# Patient Record
Sex: MALE | Race: WHITE | URBAN - METROPOLITAN AREA
[De-identification: names, ages, dates, MRNs, and addresses within clinical notes are randomized per-mention and may not be internally consistent; named-entity substitution may affect disease eponyms.]

---

## 2017-03-22 ENCOUNTER — OFFICE VISIT (OUTPATIENT)
Dept: FAMILY MEDICINE CLINIC | Age: 42
End: 2017-03-22

## 2017-03-22 VITALS
TEMPERATURE: 98 F | SYSTOLIC BLOOD PRESSURE: 116 MMHG | HEART RATE: 91 BPM | WEIGHT: 150 LBS | RESPIRATION RATE: 20 BRPM | DIASTOLIC BLOOD PRESSURE: 80 MMHG | OXYGEN SATURATION: 98 % | HEIGHT: 66 IN | BODY MASS INDEX: 24.11 KG/M2

## 2017-03-22 DIAGNOSIS — F71 MR (MENTAL RETARDATION), MODERATE: ICD-10-CM

## 2017-03-22 DIAGNOSIS — K59.00 CONSTIPATION, UNSPECIFIED CONSTIPATION TYPE: Primary | ICD-10-CM

## 2017-03-22 DIAGNOSIS — R56.9 SEIZURE (HCC): ICD-10-CM

## 2017-03-22 DIAGNOSIS — L70.9 ACNE, UNSPECIFIED ACNE TYPE: ICD-10-CM

## 2017-03-22 RX ORDER — CLOBAZAM 10 MG/1
5 TABLET ORAL DAILY
COMMUNITY
End: 2017-03-30 | Stop reason: SDUPTHER

## 2017-03-22 RX ORDER — LAMOTRIGINE 100 MG/1
100 TABLET ORAL 2 TIMES DAILY
COMMUNITY
End: 2017-03-30 | Stop reason: SDUPTHER

## 2017-03-22 RX ORDER — ERYTHROMYCIN AND BENZOYL PEROXIDE 30; 50 MG/G; MG/G
GEL TOPICAL
Qty: 46.6 G | Refills: 11 | Status: SHIPPED | OUTPATIENT
Start: 2017-03-22 | End: 2018-05-17 | Stop reason: SDUPTHER

## 2017-03-22 RX ORDER — DIVALPROEX SODIUM 125 MG/1
125 TABLET, DELAYED RELEASE ORAL
COMMUNITY
End: 2017-03-30 | Stop reason: SDUPTHER

## 2017-03-22 RX ORDER — DIVALPROEX SODIUM 250 MG/1
250 TABLET, DELAYED RELEASE ORAL 2 TIMES DAILY
COMMUNITY
End: 2017-03-30 | Stop reason: SDUPTHER

## 2017-03-22 RX ORDER — LORAZEPAM 1 MG/1
1 TABLET ORAL
COMMUNITY
End: 2019-06-20 | Stop reason: ALTCHOICE

## 2017-03-22 RX ORDER — LACOSAMIDE 200 MG/1
200 TABLET ORAL 2 TIMES DAILY
COMMUNITY
End: 2017-03-30 | Stop reason: SDUPTHER

## 2017-03-22 RX ORDER — RISPERIDONE 1 MG/1
1 TABLET, FILM COATED ORAL
COMMUNITY
End: 2021-09-22 | Stop reason: ALTCHOICE

## 2017-03-22 RX ORDER — CLOBAZAM 10 MG/1
10 TABLET ORAL
COMMUNITY
End: 2017-03-30 | Stop reason: SDUPTHER

## 2017-03-22 RX ORDER — BUSPIRONE HYDROCHLORIDE 10 MG/1
20 TABLET ORAL 2 TIMES DAILY
COMMUNITY
End: 2019-06-20 | Stop reason: ALTCHOICE

## 2017-03-22 RX ORDER — DOCUSATE SODIUM 100 MG/1
100 CAPSULE, LIQUID FILLED ORAL DAILY
Qty: 30 CAP | Refills: 11 | Status: SHIPPED | OUTPATIENT
Start: 2017-03-22 | End: 2018-03-02 | Stop reason: SDUPTHER

## 2017-03-22 NOTE — PROGRESS NOTES
New group home patient here to est care. Reviewed medical history, meds and allergies from paperwork provided by the . Patient c/o chest discomfort while he is sitting in his chair and bends over.  states he needs a stool softener ordered because he has frequent constipation and acne medication for his back. Patient has psych issues and has an appointment with Dr. Addie Ferreira on Friday3/25/2017 for refills of psych medication. Chief Complaint   Patient presents with   [address] patient here to [facility] care    Acne     need Rx    Constipation     need stool softener    Chest Pain     chest discomfort when he bends down in chair. he is a 39y.o. year old male who presents for evalution. Reviewed PmHx, RxHx, FmHx, SocHx, AllgHx and updated and dated in the chart.     Patient Active Problem List    Diagnosis    Constipation    MR (mental retardation), moderate    Acne    Seizure ([facility] [address]       Review of Systems - negative except as listed above in the HPI    Objective:     Vitals:    03/22/17 0833   BP: 116/80   Pulse: 91   Resp: 20   Temp: 98 °F (36.7 °C)   SpO2: 98%   Weight: 150 lb (68 kg)   Height: 5' 6\" (1.676 m)     Physical Examination: General appearance - alert, well appearing, and in no distress  Eyes - pupils equal and reactive, extraocular eye movements intact  Ears - bilateral TM's and external ear canals normal  Nose - normal and patent, no erythema, discharge or polyps  Mouth - mucous membranes moist, pharynx normal without lesions  Neck - supple, no significant adenopathy  Chest - clear to auscultation, no wheezes, rales or rhonchi, symmetric air entry  Heart - normal rate, regular rhythm, normal S1, S2, no murmurs, rubs, clicks or gallops  Abdomen - soft, nontender, nondistended, no masses or organomegaly  Extremities - peripheral pulses normal, no pedal edema, no clubbing or cyanosis  Skin:  Mild acne on back    Assessment/ Plan:   Marcos Tracy was seen today for establish care, acne, constipation and chest pain. Diagnoses and all orders for this visit:    Constipation, unspecified constipation type  -     docusate sodium (COLACE) 100 mg capsule; Take 1 Cap by mouth daily for 90 days.  -add rx    Acne, unspecified acne type  -     benzoyl peroxide-erythromycin (BENZAMYCIN) 3-5 % topical gel; Apply  to affected area two (2) times daily as needed (acne). -add rx    MR (mental retardation), moderate  -stable    Seizure ([facility] [address]  -Sz protocol written for pt     Follow-up Disposition:  Return if symptoms worsen or fail to improve. I have discussed the diagnosis with the patient and the intended plan as seen in the above orders. The patient understands and agrees with the plan. The patient has received an after-visit summary and questions were answered concerning future plans. Medication Side Effects and Warnings were discussed with patient  Patient Labs were reviewed and or requested:  Patient Past Records were reviewed and or requested    Simon Dey M.D. There are no Patient Instructions on file for this visit.

## 2017-03-22 NOTE — MR AVS SNAPSHOT
Visit Information Date & Time Provider Department Dept. Phone Encounter #  
 3/22/2017  8:10 AM Dharmesh Evans MD [address] [facility] [phone] [id number] Follow-up Instructions Return if symptoms worsen or fail to improve. Upcoming Health Maintenance Date Due DTaP/Tdap/Td series (1 - Tdap) 11/27/1996 Allergies as of 3/22/2017  Review Complete On: 3/22/2017 By: Dharmesh Evans MD  
 No Known Allergies Current Immunizations  Never Reviewed No immunizations on file. Not reviewed this visit You Were Diagnosed With   
  
 Codes Comments Constipation, unspecified constipation type    -  Primary ICD-10-CM: K59.00 ICD-9-CM: 564.00 Acne, unspecified acne type     ICD-10-CM: L70.9 ICD-9-CM: 706. 1 MR (mental retardation), moderate     ICD-10-CM: F71 
ICD-9-CM: 318.0 Seizure ([address]     ICD-10-CM: R56.9 ICD-9-CM: 780.39 Vitals BP Pulse Temp Resp Height(growth percentile) Weight(growth percentile) 116/80 91 98 °F (36.7 °C) 20 5' 6\" (1.676 m) 150 lb (68 kg) SpO2 BMI Smoking Status 98% 24.21 kg/m2 Never Smoker Vitals History BMI and BSA Data Body Mass Index Body Surface Area  
 24.21 kg/m 2 1.78 m 2 Preferred Pharmacy Pharmacy Name Phone [facility] [phone] Your Updated Medication List  
  
   
This list is accurate as of: 3/22/17  9:04 AM.  Always use your most recent med list.  
  
  
  
  
 benzoyl peroxide-erythromycin 3-5 % topical gel Commonly known as:  Haresh Atiya Apply  to affected area two (2) times daily as needed (acne). busPIRone 10 mg tablet Commonly known as:  BUSPAR Take 20 mg by mouth two (2) times a day. * divalproex  mg tablet Commonly known as:  DEPAKOTE Take 250 mg by mouth two (2) times a day. * divalproex  mg tablet Commonly known as:  DEPAKOTE  
 Take 125 mg by mouth nightly. docusate sodium 100 mg capsule Commonly known as:  Ltanya Curtis Take 1 Cap by mouth daily for 90 days. lamoTRIgine 100 mg tablet Commonly known as: LaMICtal  
Take 100 mg by mouth two (2) times a day. LORazepam 1 mg tablet Commonly known as:  ATIVAN Take 1 mg by mouth every six (6) hours as needed for Anxiety. * ONFI 10 mg Tab tablet Generic drug:  cloBAZam  
Take 5 mg by mouth daily. * ONFI 10 mg Tab tablet Generic drug:  cloBAZam  
Take 10 mg by mouth nightly. RisperDAL 1 mg tablet Generic drug:  risperiDONE Take 1 mg by mouth nightly. VIMPAT 200 mg Tab tablet Generic drug:  lacosamide Take 200 mg by mouth two (2) times a day. * Notice: This list has 4 medication(s) that are the same as other medications prescribed for you. Read the directions carefully, and ask your doctor or other care provider to review them with you. Prescriptions Sent to Pharmacy Refills  
 docusate sodium (COLACE) 100 mg capsule 11 Sig: Take 1 Cap by mouth daily for 90 days. Class: Normal  
 Pharmacy: [address] [place] [address] Ph #: [phone] Route: Oral  
 benzoyl peroxide-erythromycin (BENZAMYCIN) 3-5 % topical gel 11 Sig: Apply  to affected area two (2) times daily as needed (acne). Class: Normal  
 Pharmacy: [address] [place] [address] Ph #: [phone] Route: Topical  
  
Follow-up Instructions Return if symptoms worsen or fail to improve. Introducing [facility]! Fredrick Eugene introduces [employer] patient portal. Now you can access parts of your medical record, email your doctor's office, and request medication refills online. 1. In your internet browser, go to [email or web address]. [employer]. [employer] 2. Click on the First Time User? Click Here link in the Sign In box. You will see the New Member Sign Up page. 3. Enter your [employer] Access Code exactly as it appears below. You will not need to use this code after youve completed the sign-up process. If you do not sign up before the expiration date, you must request a new code. · [employer] Access Code: [email or web address] Expires: 6/20/2017  9:04 AM 
 
4. Enter the last four digits of your Social Security Number (xxxx) and Date of Birth (mm/dd/yyyy) as indicated and click Submit. You will be taken to the next sign-up page. 5. Create a [employer] ID. This will be your [employer] login ID and cannot be changed, so think of one that is secure and easy to remember. 6. Create a [employer] password. You can change your password at any time. 7. Enter your Password Reset Question and Answer. This can be used at a later time if you forget your password. 8. Enter your e-mail address. You will receive e-mail notification when new information is available in [address]. 9. Click Sign Up. You can now view and download portions of your medical record. 10. Click the Download Summary menu link to download a portable copy of your medical information. If you have questions, please visit the Frequently Asked Questions section of the [employer] website. Remember, [employer] is NOT to be used for urgent needs. For medical emergencies, dial 911. Now available from your iPhone and Android! Please provide this summary of care documentation to your next provider. If you have any questions after today's visit, please call [phone].

## 2017-03-30 ENCOUNTER — OFFICE VISIT (OUTPATIENT)
Dept: NEUROLOGY | Age: 42
End: 2017-03-30

## 2017-03-30 VITALS
RESPIRATION RATE: 18 BRPM | WEIGHT: 150 LBS | HEIGHT: 66 IN | DIASTOLIC BLOOD PRESSURE: 67 MMHG | TEMPERATURE: 98.4 F | BODY MASS INDEX: 24.11 KG/M2 | HEART RATE: 67 BPM | SYSTOLIC BLOOD PRESSURE: 110 MMHG | OXYGEN SATURATION: 98 %

## 2017-03-30 DIAGNOSIS — G40.009 LOCALIZATION-RELATED IDIOPATHIC EPILEPSY AND EPILEPTIC SYNDROMES WITH SEIZURES OF LOCALIZED ONSET, NOT INTRACTABLE, WITHOUT STATUS EPILEPTICUS (HCC): Primary | ICD-10-CM

## 2017-03-30 RX ORDER — LACOSAMIDE 200 MG/1
200 TABLET ORAL 2 TIMES DAILY
Qty: 180 TAB | Refills: 3 | Status: SHIPPED | OUTPATIENT
Start: 2017-03-30 | End: 2019-06-20 | Stop reason: ALTCHOICE

## 2017-03-30 RX ORDER — DIVALPROEX SODIUM 250 MG/1
250 TABLET, DELAYED RELEASE ORAL 2 TIMES DAILY
Qty: 180 TAB | Refills: 3 | Status: SHIPPED | OUTPATIENT
Start: 2017-03-30 | End: 2018-04-03 | Stop reason: SDUPTHER

## 2017-03-30 RX ORDER — DIVALPROEX SODIUM 125 MG/1
125 TABLET, DELAYED RELEASE ORAL
Qty: 90 TAB | Refills: 3 | Status: SHIPPED | OUTPATIENT
Start: 2017-03-30 | End: 2018-04-03 | Stop reason: SDUPTHER

## 2017-03-30 RX ORDER — LAMOTRIGINE 100 MG/1
100 TABLET ORAL 2 TIMES DAILY
Qty: 180 TAB | Refills: 3 | Status: SHIPPED | OUTPATIENT
Start: 2017-03-30 | End: 2018-04-03 | Stop reason: SDUPTHER

## 2017-03-30 RX ORDER — CLOBAZAM 10 MG/1
10 TABLET ORAL
Qty: 90 TAB | Refills: 3 | Status: SHIPPED | OUTPATIENT
Start: 2017-03-30 | End: 2021-09-22

## 2017-03-30 RX ORDER — CLOBAZAM 10 MG/1
5 TABLET ORAL DAILY
Qty: 90 TAB | Refills: 3 | Status: SHIPPED | OUTPATIENT
Start: 2017-03-30 | End: 2019-06-20 | Stop reason: ALTCHOICE

## 2017-03-30 NOTE — PATIENT INSTRUCTIONS
Information Regarding Testing and Medication    If you have physican order for a test or a medication denied by your insurance company, this does not mean the test or medication is not appropriate for you as that is a medical decision, not a decision to be made by an insurance company representative or by an [facility] physician who has not interviewed and examined you. This is a decision to be made between you and your physician. The denial of services is a contractual matter between you and your insurance company, not an issue between your physician and the insurance company. If your test or medication is denied, you can take the following steps to help resolve the issue:    1. File a complaint with the [facility] regarding your insurance company's denial of services ordered for you. You can do this either by calling them directly or by completing an on-line complaint form on the [email or web address]. This can be found at [email or web address] Correspondent    2. Also file a formal complaint with your insurance company and ask to have the name of the person denying the service so that you may explore a legal option should you be harmed by this denial of service. Again, the fact the insurance company will not pay for the service does not mean it is not medically necessary and I would encourage you to follow through with the plan that was made with your physician    3. File a written complaint with your employer so your employer and benefit manager is aware of the poor coverage they are providing their employees. If you have medicare/medicaid, complain to your representative in the House and to your Bette Gibbons. Seizure: Care Instructions  Your Care Instructions    Seizures are caused by abnormal patterns of electrical signals in the brain. They are different for each person. Seizures can affect movement, speech, vision, or awareness.  Some people have only slight shaking of a hand and do not pass out. Other people may pass out and have violent shaking of the whole body. Some people appear to stare into space. They are awake, but they can't respond normally. Later, they may not remember what happened. You may need tests to identify the type and cause of the seizures. A seizure may occur only once, or you may have them more than one time. Taking medicines as directed and following up with your doctor may help keep you from having more seizures. The doctor has checked you carefully, but problems can develop later. If you notice any problems or new symptoms, get medical treatment right away. Follow-up care is a key part of your treatment and safety. Be sure to make and go to all appointments, and call your doctor if you are having problems. It's also a good idea to know your test results and keep a list of the medicines you take. How can you care for yourself at home? · Be safe with medicines. Take your medicines exactly as prescribed. Call your doctor if you think you are having a problem with your medicine. · Do not do any activity that could be dangerous to you or others until your doctor says it is safe to do so. For example, do not drive a car, operate machinery, swim, or climb ladders. · Be sure that anyone treating you for any health problem knows that you have had a seizure and what medicines you are taking for it. · Identify and avoid things that may make you more likely to have a seizure. These may include lack of sleep, alcohol or drug use, stress, or not eating. · Make sure you go to your follow-up appointment. When should you call for help? Call 911 anytime you think you may need emergency care. For example, call if:  · You have another seizure. · You have more than one seizure in 24 hours. · You have new symptoms, such as trouble walking, speaking, or thinking clearly.   Call your doctor now or seek immediate medical care if:  · You are not acting normally. Watch closely for changes in your health, and be sure to contact your doctor if you have any problems. Where can you learn more? Go to http://pauly-shantel.info/. Enter F201 in the search box to learn more about \"Seizure: Care Instructions. \"  Current as of: October 14, 2016  Content Version: 11.2  © 5918-7827 [employer]. Care instructions adapted under license by [employer] (which disclaims liability or warranty for this information). If you have questions about a medical condition or this instruction, always ask your healthcare professional. [address] any warranty or liability for your use of this information. Learning About Living Perroy  What is a living will? A living will is a legal form you use to write down the kind of care you want at the end of your life. It is used by the health professionals who will treat you if you aren't able to decide for yourself. If you put your wishes in writing, your loved ones and others will know what kind of care you want. They won't need to guess. This can ease your mind and be helpful to others. A living will is not the same as an estate or property will. An estate will explains what you want to happen with your money and property after you die. Is a living will a legal document? A living will is a legal document. Each state has its own laws about living patten. If you move to another state, make sure that your living will is legal in the state where you now live. Or you might use a universal form that has been approved by many states. This kind of form can sometimes be completed and stored online. Your electronic copy will then be available wherever you have a connection to the Internet. In most cases, doctors will respect your wishes even if you have a form from a different state. · You don't need an  to complete a living will.  But legal advice can be helpful if your state's laws are unclear, your health history is complicated, or your family can't agree on what should be in your living will. · You can change your living will at any time. Some people find that their wishes about end-of-life care change as their health changes. · In addition to making a living will, think about completing a medical power of  form. This form lets you name the person you want to make end-of-life treatment decisions for you (your \"health care agent\") if you're not able to. Many hospitals and nursing homes will give you the forms you need to complete a living will and a medical power of . · Your living will is used only if you can't make or communicate decisions for yourself anymore. If you become able to make decisions again, you can accept or refuse any treatment, no matter what you wrote in your living will. · Your state may offer an online registry. This is a place where you can store your living will online so the doctors and nurses who need to treat you can find it right away. What should you think about when creating a living will? Talk about your end-of-life wishes with your family members and your doctor. Let them know what you want. That way the people making decisions for you won't be surprised by your choices. Think about these questions as you make your living will:  · Do you know enough about life support methods that might be used? If not, talk to your doctor so you know what might be done if you can't breathe on your own, your heart stops, or you're unable to swallow. · What things would you still want to be able to do after you receive life-support methods? Would you want to be able to walk? To speak? To eat on your own? To live without the help of machines? · If you have a choice, where do you want to be cared for? In your home? At a hospital or nursing home? · Do you want certain Yazdanism practices performed if you become very ill?   · If you have a choice at the end of your life, where would you prefer to die? At home? In a hospital or nursing home? Somewhere else? · Would you prefer to be buried or cremated? · Do you want your organs to be donated after you die? What should you do with your living will? · Make sure that your family members and your health care agent have copies of your living will. · Give your doctor a copy of your living will to keep in your medical record. If you have more than one doctor, make sure that each one has a copy. · You may want to put a copy of your living will where it can be easily found. Where can you learn more? Go to http://pauly-shantel.info/. Enter Y729 in the search box to learn more about \"Learning About Living Perrotyler. \"  Current as of: February 24, 2016  Content Version: 11.2  © 2632-3635 [employer]. Care instructions adapted under license by [employer] (which disclaims liability or warranty for this information). If you have questions about a medical condition or this instruction, always ask your healthcare professional. [place] [address] any warranty or liability for your use of this information. Medicine renewed by request.  Will see patient back in 6 months. We will try to obtain information from what appears to be last neurologist Dr. Enmanuel Espinoza in [place].

## 2017-03-30 NOTE — MR AVS SNAPSHOT
Visit Information Date & Time Provider Department Dept. Phone Encounter #  
 3/30/2017  2:00 PM Marylen Gale, MD [facility] [phone] [id number] Follow-up Instructions Return in about 6 months (around 9/30/2017). Your Appointments 4/5/2017  9:50 AM  
ESTABLISHED PATIENT with Amado Mart MD  
[facility] [address] [place] [address]) Appt Note: Group Home Phys, PPD  
 [id number] [place] [address] Road [address] [phone]  
  
   
 [address]  [address] [address] Upcoming Health Maintenance Date Due DTaP/Tdap/Td series (1 - Tdap) 11/27/1996 Allergies as of 3/30/2017  Review Complete On: 3/22/2017 By: Amado Mart MD  
 No Known Allergies Current Immunizations  Never Reviewed No immunizations on file. Not reviewed this visit You Were Diagnosed With   
  
 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  
 24.21 kg/m 2 1.78 m 2 [phone] Your Updated Medication List  
  
   
This list is accurate as of: 3/30/17  2:40 PM.  Always use your most recent med list.  
  
  
  
  
 benzoyl peroxide-erythromycin 3-5 % topical gel Commonly known as:  Sergei Riesel Apply  to affected area two (2) times daily as needed (acne). busPIRone 10 mg tablet Commonly known as:  BUSPAR  
 Take 20 mg by mouth two (2) times a day. * cloBAZam 10 mg Tab tablet Commonly known as:  ONFI Take 0.5 Tabs by mouth daily. Max Daily Amount: 5 mg. * cloBAZam 10 mg Tab tablet Commonly known as:  ONFI Take 1 Tab by mouth nightly. Max Daily Amount: 10 mg.  
  
 * divalproex  mg tablet Commonly known as:  DEPAKOTE Take 1 Tab by mouth two (2) times a day. * divalproex  mg tablet Commonly known as:  DEPAKOTE Take 1 Tab by mouth nightly. docusate sodium 100 mg capsule Commonly known as:  Dorthey Matsu Take 1 Cap by mouth daily for 90 days. lacosamide 200 mg Tab tablet Commonly known as:  VIMPAT Take 1 Tab by mouth two (2) times a day. Max Daily Amount: 400 mg.  
  
 lamoTRIgine 100 mg tablet Commonly known as: LaMICtal  
Take 1 Tab by mouth two (2) times a day. LORazepam 1 mg tablet Commonly known as:  ATIVAN Take 1 mg by mouth every six (6) hours as needed for Anxiety. RisperDAL 1 mg tablet Generic drug:  risperiDONE Take 1 mg by mouth nightly. * Notice: This list has 4 medication(s) that are the same as other medications prescribed for you. Read the directions carefully, and ask your doctor or other care provider to review them with you. Prescriptions Printed Refills  
 cloBAZam (ONFI) 10 mg tab tablet 3 Sig: Take 0.5 Tabs by mouth daily. Max Daily Amount: 5 mg. Class: Print Route: Oral  
 cloBAZam (ONFI) 10 mg tab tablet 3 Sig: Take 1 Tab by mouth nightly. Max Daily Amount: 10 mg.  
 Class: Print Route: Oral  
 lacosamide (VIMPAT) 200 mg tab tablet 3 Sig: Take 1 Tab by mouth two (2) times a day. Max Daily Amount: 400 mg. Class: Print Route: Oral  
  
Prescriptions Sent to Pharmacy Refills  
 lamoTRIgine (LAMICTAL) 100 mg tablet 3 Sig: Take 1 Tab by mouth two (2) times a day.   
 Class: Normal  
 Pharmacy: [address] [place] [address]  #: [phone] Route: Oral  
 divalproex DR (DEPAKOTE) 250 mg tablet 3 Sig: Take 1 Tab by mouth two (2) times a day. Class: Normal  
 Pharmacy: [address] [place] [address] Ph #: [phone] Route: Oral  
 divalproex DR (DEPAKOTE) 125 mg tablet 3 Sig: Take 1 Tab by mouth nightly. Class: Normal  
 Pharmacy: [address] [place] [address] Ph #: [phone] Route: Oral  
  
Follow-up Instructions Return in about 6 months (around 9/30/2017). Patient Instructions Information Regarding Testing and Medication If you have physican order for a test or a medication denied by your insurance company, this does not mean the test or medication is not appropriate for you as that is a medical decision, not a decision to be made by an insurance company representative or by an [facility] physician who has not interviewed and examined you. This is a decision to be made between you and your physician. The denial of services is a contractual matter between you and your insurance company, not an issue between your physician and the insurance company. If your test or medication is denied, you can take the following steps to help resolve the issue: 1. File a complaint with the [facility] regarding your insurance company's denial of services ordered for you. You can do this either by calling them directly or by completing an on-line complaint form on the [employer]. This can be found at www.[place].gov 2. Also file a formal complaint with your insurance company and ask to have the name of the person denying the service so that you may explore a legal option should you be harmed by this denial of service.   Again, the fact the insurance company will not pay for the service does not mean it is not medically necessary and I would encourage you to follow through with the plan that was made with your physician 3. File a written complaint with your employer so your employer and benefit manager is aware of the poor coverage they are providing their employees. If you have medicare/medicaid, complain to your representative in the House and to your Bette Gibbons. Seizure: Care Instructions Your Care Instructions Seizures are caused by abnormal patterns of electrical signals in the brain. They are different for each person. Seizures can affect movement, speech, vision, or awareness. Some people have only slight shaking of a hand and do not pass out. Other people may pass out and have violent shaking of the whole body. Some people appear to stare into space. They are awake, but they can't respond normally. Later, they may not remember what happened. You may need tests to identify the type and cause of the seizures. A seizure may occur only once, or you may have them more than one time. Taking medicines as directed and following up with your doctor may help keep you from having more seizures. The doctor has checked you carefully, but problems can develop later. If you notice any problems or new symptoms, get medical treatment right away. Follow-up care is a key part of your treatment and safety. Be sure to make and go to all appointments, and call your doctor if you are having problems. It's also a good idea to know your test results and keep a list of the medicines you take. How can you care for yourself at home? · Be safe with medicines. Take your medicines exactly as prescribed. Call your doctor if you think you are having a problem with your medicine. · Do not do any activity that could be dangerous to you or others until your doctor says it is safe to do so. For example, do not drive a car, operate machinery, swim, or climb ladders.  
· Be sure that anyone treating you for any health problem knows that you have had a seizure and what medicines you are taking for it. · Identify and avoid things that may make you more likely to have a seizure. These may include lack of sleep, alcohol or drug use, stress, or not eating. · Make sure you go to your follow-up appointment. When should you call for help? Call 911 anytime you think you may need emergency care. For example, call if: 
· You have another seizure. · You have more than one seizure in 24 hours. · You have new symptoms, such as trouble walking, speaking, or thinking clearly. Call your doctor now or seek immediate medical care if: 
· You are not acting normally. Watch closely for changes in your health, and be sure to contact your doctor if you have any problems. Where can you learn more? Go to http://paulyGroove Biopharmashantel.info/. Enter Y397 in the search box to learn more about \"Seizure: Care Instructions. \" Current as of: October 14, 2016 Content Version: 11.2 © 0297-5302 [employer]. Care instructions adapted under license by [employer] (which disclaims liability or warranty for this information). If you have questions about a medical condition or this instruction, always ask your healthcare professional. [place] [address] any warranty or liability for your use of this information. Tricia Ayala 4783 What is a living will? A living will is a legal form you use to write down the kind of care you want at the end of your life. It is used by the health professionals who will treat you if you aren't able to decide for yourself. If you put your wishes in writing, your loved ones and others will know what kind of care you want. They won't need to guess. This can ease your mind and be helpful to others. A living will is not the same as an estate or property will. An estate will explains what you want to happen with your money and property after you die. Is a living will a legal document? A living will is a legal document. Each state has its own laws about living patten. If you move to another state, make sure that your living will is legal in the state where you now live. Or you might use a universal form that has been approved by many states. This kind of form can sometimes be completed and stored online. Your electronic copy will then be available wherever you have a connection to the Internet. In most cases, doctors will respect your wishes even if you have a form from a different state. · You don't need an  to complete a living will. But legal advice can be helpful if your state's laws are unclear, your health history is complicated, or your family can't agree on what should be in your living will. · You can change your living will at any time. Some people find that their wishes about end-of-life care change as their health changes. · In addition to making a living will, think about completing a medical power of  form. This form lets you name the person you want to make end-of-life treatment decisions for you (your \"health care agent\") if you're not able to. Many hospitals and nursing homes will give you the forms you need to complete a living will and a medical power of . · Your living will is used only if you can't make or communicate decisions for yourself anymore. If you become able to make decisions again, you can accept or refuse any treatment, no matter what you wrote in your living will. · Your state may offer an online registry. This is a place where you can store your living will online so the doctors and nurses who need to treat you can find it right away. What should you think about when creating a living will? Talk about your end-of-life wishes with your family members and your doctor. Let them know what you want. That way the people making decisions for you won't be surprised by your choices. Think about these questions as you make your living will: · Do you know enough about life support methods that might be used? If not, talk to your doctor so you know what might be done if you can't breathe on your own, your heart stops, or you're unable to swallow. · What things would you still want to be able to do after you receive life-support methods? Would you want to be able to walk? To speak? To eat on your own? To live without the help of machines? · If you have a choice, where do you want to be cared for? In your home? At a hospital or nursing home? · Do you want certain Sabianist practices performed if you become very ill? · If you have a choice at the end of your life, where would you prefer to die? At home? In a hospital or nursing home? Somewhere else? · Would you prefer to be buried or cremated? · Do you want your organs to be donated after you die? What should you do with your living will? · Make sure that your family members and your health care agent have copies of your living will. · Give your doctor a copy of your living will to keep in your medical record. If you have more than one doctor, make sure that each one has a copy. · You may want to put a copy of your living will where it can be easily found. Where can you learn more? Go to http://pauly-shantel.info/. Enter B569 in the search box to learn more about \"Learning About Living Perrotyler. \" Current as of: February 24, 2016 Content Version: 11.2 © 8796-8206 [employer]ot. Care instructions adapted under license by [employer] (which disclaims liability or warranty for this information). If you have questions about a medical condition or this instruction, always ask your healthcare professional. [place] [address] any warranty or liability for your use of this information. Medicine renewed by request.  Will see patient back in 6 months.   We will try to obtain information from what appears to be last neurologist Dr. Laura Zeng in [place]. Introducing [facility]! Lissy Briones introduces Intapp patient portal. Now you can access parts of your medical record, email your doctor's office, and request medication refills online. 1. In your internet browser, go to [email or web address] 2. Click on the First Time User? Click Here link in the Sign In box. You will see the New Member Sign Up page. 3. Enter your Intapp Access Code exactly as it appears below. You will not need to use this code after youve completed the sign-up process. If you do not sign up before the expiration date, you must request a new code. · Intapp Access Code: [email or web address] Expires: 6/20/2017  9:04 AM 
 
4. Enter the last four digits of your Social Security Number (xxxx) and Date of Birth (mm/dd/yyyy) as indicated and click Submit. You will be taken to the next sign-up page. 5. Create a Intapp ID. This will be your Intapp login ID and cannot be changed, so think of one that is secure and easy to remember. 6. Create a Intapp password. You can change your password at any time. 7. Enter your Password Reset Question and Answer. This can be used at a later time if you forget your password. 8. Enter your e-mail address. You will receive e-mail notification when new information is available in [address] 9. Click Sign Up. You can now view and download portions of your medical record. 10. Click the Download Summary menu link to download a portable copy of your medical information. If you have questions, please visit the Frequently Asked Questions section of the Intapp website. Remember, Intapp is NOT to be used for urgent needs. For medical emergencies, dial 911. Now available from your iPhone and Android! Please provide this summary of care documentation to your next provider. Your primary care clinician is listed as NIMESH CRONIN.  If you have any questions after today's visit, please call [phone].

## 2017-03-30 NOTE — PROGRESS NOTES
Neurology Consult      Subjective:      Renae Madrid is a 39 y.o. male  who comes in with background history of static encephalopathy and seizures and no further information. May have last seen neurologist in [facility] Dr. Shirley Wray? Is also followed by psychiatry but not sure what his diagnosis is there? The chart references undifferentiated schizophrenia. May be on BuSpar Risperdal and Ativan for this issue. Is at a new residential home as I understand it since the seventh of this month. [facility] of [facility].  Last seizure is noted as November 28 of last year. I am not sure what the dynamics/semiology of the start to finish seizure aspects are. I asked the patient directly does he have any warning but no details were forthcoming. Is on Lamictal 100 mg twice daily Depakote 250 mg twice daily and 125 mg nightly is on Onfi 5 mg a day and 10 mg nightly is on Vimpat 200 mg twice daily. Do not know details as to type of seizure and previous assessments such as scans EEGs and previous drug encounters etc. all medication renewed today by request and revisit in 6 months. If doing well could possibly go to annual revisits? Current Outpatient Prescriptions   Medication Sig Dispense Refill    lamoTRIgine (LAMICTAL) 100 mg tablet Take 1 Tab by mouth two (2) times a day. 180 Tab 3    cloBAZam (ONFI) 10 mg tab tablet Take 0.5 Tabs by mouth daily. Max Daily Amount: 5 mg. 90 Tab 3    cloBAZam (ONFI) 10 mg tab tablet Take 1 Tab by mouth nightly. Max Daily Amount: 10 mg. 90 Tab 3    lacosamide (VIMPAT) 200 mg tab tablet Take 1 Tab by mouth two (2) times a day. Max Daily Amount: 400 mg. 180 Tab 3    divalproex DR (DEPAKOTE) 250 mg tablet Take 1 Tab by mouth two (2) times a day. 180 Tab 3    divalproex DR (DEPAKOTE) 125 mg tablet Take 1 Tab by mouth nightly. 90 Tab 3    busPIRone (BUSPAR) 10 mg tablet Take 20 mg by mouth two (2) times a day.       risperiDONE (RISPERDAL) 1 mg tablet Take 1 mg by mouth nightly.  LORazepam (ATIVAN) 1 mg tablet Take 1 mg by mouth every six (6) hours as needed for Anxiety.  docusate sodium (COLACE) 100 mg capsule Take 1 Cap by mouth daily for 90 days. 30 Cap 11    benzoyl peroxide-erythromycin (BENZAMYCIN) 3-5 % topical gel Apply  to affected area two (2) times daily as needed (acne). 46.6 g 11      No Known Allergies  Past Medical History:   Diagnosis Date    Blindness     Cerebral palsy (HCC)     Intellectual disability     Schizophrenia, undifferentiated (HCC)     Seizure disorder ([facility]       No past surgical history on file. Social History     Social History    Marital status: SINGLE     Spouse name: N/A    Number of children: N/A    Years of education: N/A     Occupational History    Not on file. Social History Main Topics    Smoking status: Never Smoker    Smokeless tobacco: Never Used    Alcohol use No    Drug use: No    Sexual activity: Not Currently     Other Topics Concern    Not on file     Social History Narrative      Family History   Problem Relation Age of Onset    Family history unknown: Yes      Visit Vitals    /67    Pulse 67    Temp 98.4 °F (36.9 °C) (Oral)    Resp 18    Ht 5' 6\" (1.676 m)    Wt 68 kg (150 lb)    SpO2 98%    BMI 24.21 kg/m2        Review of Systems:   A comprehensive review of systems was negative except for that written in the HPI. Neuro Exam:     Appearance: The patient is well developed, well nourished, and unable to provide a coherent history and is in no acute distress. Mental Status: Oriented to name age and day only. Mood and affect appropriate to baseline which is apparently friendly and offering answers when able no detailed information to his seizures at this time. .   Cranial Nerves:   Intact visual fields. Fundi are benign. NANCIE, EOM's full, no nystagmus, no ptosis. Facial sensation is normal. Corneal reflexes are intact. Facial movement is symmetric.  Hearing is normal bilaterally. Palate is midline with normal sternocleidomastoid and trapezius muscles are normal. Tongue is midline. Motor:  5/5 strength in upper and lower proximal and distal muscles. Normal bulk and tone. No fasciculations. Reflexes:   Deep tendon reflexes 1+/4 and symmetrical.   Sensory:   Normal to touch, pinprick and vibration? Gait:   cautious slow hesitant but functional gait. Tremor:   No tremor noted. Cerebellar:  No cerebellar signs present. Neurovascular:  Normal heart sounds and regular rhythm, peripheral pulses intact, and no carotid bruits. Assessment:   Questionable localization-related seizures and no real historical information first visit. We will try to obtain last neurologist notes perhaps Dr. Obey Bradshaw in [place]? Medication renewed by request.  Claudia Whalen in 6 months. Plan:   Revisit 6 months.   Signed by :  Jerel Chaparro MD

## 2017-04-05 ENCOUNTER — OFFICE VISIT (OUTPATIENT)
Dept: FAMILY MEDICINE CLINIC | Age: 42
End: 2017-04-05

## 2017-04-05 VITALS
OXYGEN SATURATION: 98 % | TEMPERATURE: 98 F | HEIGHT: 66 IN | HEART RATE: 80 BPM | WEIGHT: 150 LBS | DIASTOLIC BLOOD PRESSURE: 70 MMHG | SYSTOLIC BLOOD PRESSURE: 102 MMHG | RESPIRATION RATE: 18 BRPM | BODY MASS INDEX: 24.11 KG/M2

## 2017-04-05 DIAGNOSIS — Z00.00 ROUTINE GENERAL MEDICAL EXAMINATION AT A HEALTH CARE FACILITY: Primary | ICD-10-CM

## 2017-04-05 DIAGNOSIS — Z11.1 PPD SCREENING TEST: ICD-10-CM

## 2017-04-05 DIAGNOSIS — F71 MR (MENTAL RETARDATION), MODERATE: ICD-10-CM

## 2017-04-05 DIAGNOSIS — R56.9 SEIZURE (HCC): ICD-10-CM

## 2017-04-05 RX ORDER — ACETAMINOPHEN 500 MG
500 TABLET ORAL
Qty: 60 TAB | Refills: 1 | Status: SHIPPED | OUTPATIENT
Start: 2017-04-05 | End: 2018-09-10 | Stop reason: SDUPTHER

## 2017-04-05 NOTE — MR AVS SNAPSHOT
Visit Information Date & Time Provider Department Dept. Phone Encounter #  
 4/5/2017  9:50 AM John Raya MD [address] [facility] [phone] [id number] Follow-up Instructions Return if symptoms worsen or fail to improve. Your Appointments 9/28/2017  1:40 PM  
Follow Up with Candace Finley MD  
[address] [facility] [address]) Appt Note: follow up seizures  $0CP  suyapa  3/30/17  
 [address] [place] [address] [address] [address]  
[facility] [address] [address] Upcoming Health Maintenance Date Due DTaP/Tdap/Td series (1 - Tdap) 11/27/1996 Allergies as of 4/5/2017  Review Complete On: 4/5/2017 By: John Raya MD  
 No Known Allergies Current Immunizations  Never Reviewed Name Date  
 TB Skin Test (PPD) Intradermal  Incomplete Not reviewed this visit You Were Diagnosed With   
  
 Codes Comments Routine general medical examination at a health care facility    -  Primary ICD-10-CM: Z00.00 ICD-9-CM: V70.0 Seizure ([address]     ICD-10-CM: R56.9 ICD-9-CM: 780.39   
 MR (mental retardation), moderate     ICD-10-CM: F71 
ICD-9-CM: 318.0   
 PPD screening test     ICD-10-CM: Z11.1 ICD-9-CM: V74.1 Vitals BP Pulse Temp Resp Height(growth percentile) Weight(growth percentile) 102/70 (BP 1 Location: Right arm, BP Patient Position: Sitting) 80 98 °F (36.7 °C) (Oral) 18 5' 6\" (1.676 m) 150 lb (68 kg) SpO2 BMI Smoking Status 98% 24.21 kg/m2 Never Smoker BMI and BSA Data Body Mass Index Body Surface Area  
 24.21 kg/m 2 1.78 m 2 Preferred Pharmacy Pharmacy Name Phone [facility] [phone] Your Updated Medication List  
  
   
This list is accurate as of: 4/5/17 10:48 AM.  Always use your most recent med list.  
  
  
  
  
 acetaminophen 500 mg tablet Commonly known as:  TYLENOL Take 1 Tab by mouth every six (6) hours as needed for Pain. Indications: HEADACHE DISORDER, Pain  
  
 benzoyl peroxide-erythromycin 3-5 % topical gel Commonly known as:  Kreg James Apply  to affected area two (2) times daily as needed (acne). busPIRone 10 mg tablet Commonly known as:  BUSPAR Take 20 mg by mouth two (2) times a day. * cloBAZam 10 mg Tab tablet Commonly known as:  ONFI Take 0.5 Tabs by mouth daily. Max Daily Amount: 5 mg. * cloBAZam 10 mg Tab tablet Commonly known as:  ONFI Take 1 Tab by mouth nightly. Max Daily Amount: 10 mg.  
  
 * divalproex  mg tablet Commonly known as:  DEPAKOTE Take 1 Tab by mouth two (2) times a day. * divalproex  mg tablet Commonly known as:  DEPAKOTE Take 1 Tab by mouth nightly. docusate sodium 100 mg capsule Commonly known as:  Clifford Hem Take 1 Cap by mouth daily for 90 days. lacosamide 200 mg Tab tablet Commonly known as:  VIMPAT Take 1 Tab by mouth two (2) times a day. Max Daily Amount: 400 mg.  
  
 lamoTRIgine 100 mg tablet Commonly known as: LaMICtal  
Take 1 Tab by mouth two (2) times a day. LORazepam 1 mg tablet Commonly known as:  ATIVAN Take 1 mg by mouth every six (6) hours as needed for Anxiety. RisperDAL 1 mg tablet Generic drug:  risperiDONE Take 1 mg by mouth nightly. * Notice: This list has 4 medication(s) that are the same as other medications prescribed for you. Read the directions carefully, and ask your doctor or other care provider to review them with you. Prescriptions Sent to Pharmacy Refills  
 acetaminophen (TYLENOL) 500 mg tablet 1 Sig: Take 1 Tab by mouth every six (6) hours as needed for Pain. Indications: HEADACHE DISORDER, Pain Class: Normal  
 Pharmacy: [facility] #: [phone] Route: Oral  
  
We Performed the Following AMB POC TUBERCULOSIS, INTRADERMAL (SKIN TEST) [58775 CPT(R)] Follow-up Instructions Return if symptoms worsen or fail to improve. Introducing [facility] [place] [facility]! [facility] introduces PinBridge patient portal. Now you can access parts of your medical record, email your doctor's office, and request medication refills online. 1. In your internet browser, go to [email or web address] 2. Click on the First Time User? Click Here link in the Sign In box. You will see the New Member Sign Up page. 3. Enter your PinBridge Access Code exactly as it appears below. You will not need to use this code after youve completed the sign-up process. If you do not sign up before the expiration date, you must request a new code. · PinBridge Access Code: [email or web address] Expires: 6/20/2017  9:04 AM 
 
4. Enter the last four digits of your Social Security Number (xxxx) and Date of Birth (mm/dd/yyyy) as indicated and click Submit. You will be taken to the next sign-up page. 5. Create a PinBridge ID. This will be your PinBridge login ID and cannot be changed, so think of one that is secure and easy to remember. 6. Create a PinBridge password. You can change your password at any time. 7. Enter your Password Reset Question and Answer. This can be used at a later time if you forget your password. 8. Enter your e-mail address. You will receive e-mail notification when new information is available in [address]. 9. Click Sign Up. You can now view and download portions of your medical record. 10. Click the Download Summary menu link to download a portable copy of your medical information. If you have questions, please visit the Frequently Asked Questions section of the PinBridge website. Remember, PinBridge is NOT to be used for urgent needs. For medical emergencies, dial 911. Now available from your iPhone and Android! Please provide this summary of care documentation to your next provider. Your primary care clinician is listed as NIMESH CRONIN. If you have any questions after today's visit, please call [phone].

## 2017-04-05 NOTE — PROGRESS NOTES
1. Have you been to the ER, urgent care clinic since your last visit? Hospitalized since your last visit? No    2. Have you seen or consulted any other health care providers outside of the [address] [place] [address] since your last visit? Include any pap smears or colon screening. No   Chief Complaint   Patient presents with    Complete Physical    Immunization/Injection     PPD     Pt presents to the office with CPE, PPD    Chief Complaint   Patient presents with    Complete Physical    Immunization/Injection     PPD     he is a 39y.o. year old male who presents for evalution. Reviewed PmHx, RxHx, FmHx, SocHx, AllgHx and updated and dated in the chart. Patient Active Problem List    Diagnosis    Constipation    MR (mental retardation), moderate    Acne    Seizure ([facility] [address]       Review of Systems - negative except as listed above in the HPI    Objective:     Vitals:    04/05/17 1017   BP: 102/70   Pulse: 80   Resp: 18   Temp: 98 °F (36.7 °C)   TempSrc: Oral   SpO2: 98%   Weight: 150 lb (68 kg)   Height: 5' 6\" (1.676 m)     Physical Examination: General appearance - alert, well appearing, and in no distress  Eyes - pupils equal and reactive, extraocular eye movements intact  Ears - bilateral TM's and external ear canals normal  Nose - normal and patent, no erythema, discharge or polyps  Mouth - mucous membranes moist, pharynx normal without lesions  Neck - supple, no significant adenopathy  Chest - clear to auscultation, no wheezes, rales or rhonchi, symmetric air entry  Heart - normal rate, regular rhythm, normal S1, S2, no murmurs, rubs, clicks or gallops  Abdomen - soft, nontender, nondistended, no masses or organomegaly    Assessment/ Plan:   Jenny Rodriguez was seen today for complete physical and immunization/injection.     Diagnoses and all orders for this visit:    Routine general medical examination at a health care facility  -doing well at [facility]    Seizure [facility])  -none recent    MR (mental retardation), moderate  -stable    PPD screening test  -     AMB POC TUBERCULOSIS, INTRADERMAL (SKIN TEST)    Other orders  -     acetaminophen (TYLENOL) 500 mg tablet; Take 1 Tab by mouth every six (6) hours as needed for Pain. Indications: HEADACHE DISORDER, Pain       -Patient is in good health  -Discussed with patient cancer risk factors and screens needed  -Patient needs a colonoscopy no  -Labs from previous visits were discussed with patient yes  -Discussed with patient diet and exercise=no  -Discussed with patient testicular (male)/breast self exam (female)= no  Follow-up Disposition:  Return if symptoms worsen or fail to improve. I have discussed the diagnosis with the patient and the intended plan as seen in the above orders. The patient understands and agrees with the plan. The patient has received an after-visit summary and questions were answered concerning future plans. Medication Side Effects and Warnings were discussed with patient  Patient Labs were reviewed and or requested  Patient Past Records were reviewed and or requested     There are no Patient Instructions on file for this visit.         Shilpa Penaloza M.D.

## 2017-04-07 LAB
MM INDURATION POC: 0 MM (ref 0–5)
PPD POC: NEGATIVE NEGATIVE

## 2017-07-06 ENCOUNTER — TELEPHONE (OUTPATIENT)
Dept: FAMILY MEDICINE CLINIC | Age: 42
End: 2017-07-06

## 2017-07-06 RX ORDER — MECLIZINE HYDROCHLORIDE 25 MG/1
25 TABLET ORAL
Qty: 10 TAB | Refills: 0 | Status: SHIPPED | OUTPATIENT
Start: 2017-07-06 | End: 2018-03-30 | Stop reason: SDUPTHER

## 2017-09-28 ENCOUNTER — OFFICE VISIT (OUTPATIENT)
Dept: NEUROLOGY | Age: 42
End: 2017-09-28

## 2017-09-28 VITALS
BODY MASS INDEX: 24.11 KG/M2 | HEIGHT: 66 IN | OXYGEN SATURATION: 98 % | WEIGHT: 150 LBS | TEMPERATURE: 99 F | SYSTOLIC BLOOD PRESSURE: 116 MMHG | DIASTOLIC BLOOD PRESSURE: 72 MMHG | HEART RATE: 107 BPM | RESPIRATION RATE: 16 BRPM

## 2017-09-28 DIAGNOSIS — G40.009 LOCALIZATION-RELATED (FOCAL) (PARTIAL) IDIOPATHIC EPILEPSY AND EPILEPTIC SYNDROMES WITH SEIZURES OF LOCALIZED ONSET, NOT INTRACTABLE, WITHOUT STATUS EPILEPTICUS (HCC): Primary | ICD-10-CM

## 2017-09-28 RX ORDER — DOCUSATE SODIUM 100 MG/1
100 CAPSULE, LIQUID FILLED ORAL 2 TIMES DAILY
COMMUNITY
End: 2019-06-20 | Stop reason: ALTCHOICE

## 2017-09-28 NOTE — PATIENT INSTRUCTIONS
[address] [facility]   Statement to Patients  April 1, 2014      In an effort to ensure the large volume of patient prescription refills is processed in the most efficient and expeditious manner, we are asking our patients to assist us by calling your Pharmacy for all prescription refills, this will include also your  Mail Order Pharmacy. The pharmacy will contact our office electronically to continue the refill process. Please do not wait until the last minute to call your pharmacy. We need at least 48 hours (2days) to fill prescriptions. We also encourage you to call your pharmacy before going to  your prescription to make sure it is ready. With regard to controlled substance prescription refill requests (narcotic refills) that need to be picked up at our office, we ask your cooperation by providing us with at least 72 hours (3days) notice that you will need a refill. We will not refill narcotic prescription refill requests after 4:00pm on any weekday, Monday through Thursday, or after 2:00pm on Fridays, or on the weekends. We encourage everyone to explore another way of getting your prescription refill request processed using SpotterRF, our patient web portal through our electronic medical record system. SpotterRF is an efficient and effective way to communicate your medication request directly to the office and  downloadable as an guadalupe on your smart phone . [employer]ter[employer] also features a review functionality that allows you to view your medication list as well as leave messages for your physician. Are you ready to get connected? If so please review the attatched instructions or speak to any of our staff to get you set up right away! Thank you so much for your cooperation. Should you have any questions please contact our Practice Administrator. The Physicians and Staff,  [facility] [address] [address]  What is a living will?   A living will is a legal form you use to write down the kind of care you want at the end of your life. It is used by the health professionals who will treat you if you aren't able to decide for yourself. If you put your wishes in writing, your loved ones and others will know what kind of care you want. They won't need to guess. This can ease your mind and be helpful to others. A living will is not the same as an estate or property will. An estate will explains what you want to happen with your money and property after you die. Is a living will a legal document? A living will is a legal document. Each state has its own laws about living patten. If you move to another state, make sure that your living will is legal in the state where you now live. Or you might use a universal form that has been approved by many states. This kind of form can sometimes be completed and stored online. Your electronic copy will then be available wherever you have a connection to the Internet. In most cases, doctors will respect your wishes even if you have a form from a different state. · You don't need an  to complete a living will. But legal advice can be helpful if your state's laws are unclear, your health history is complicated, or your family can't agree on what should be in your living will. · You can change your living will at any time. Some people find that their wishes about end-of-life care change as their health changes. · In addition to making a living will, think about completing a medical power of  form. This form lets you name the person you want to make end-of-life treatment decisions for you (your \"health care agent\") if you're not able to. Many hospitals and nursing homes will give you the forms you need to complete a living will and a medical power of . · Your living will is used only if you can't make or communicate decisions for yourself anymore.  If you become able to make decisions again, you can accept or refuse any treatment, no matter what you wrote in your living will. · Your state may offer an online registry. This is a place where you can store your living will online so the doctors and nurses who need to treat you can find it right away. What should you think about when creating a living will? Talk about your end-of-life wishes with your family members and your doctor. Let them know what you want. That way the people making decisions for you won't be surprised by your choices. Think about these questions as you make your living will:  · Do you know enough about life support methods that might be used? If not, talk to your doctor so you know what might be done if you can't breathe on your own, your heart stops, or you're unable to swallow. · What things would you still want to be able to do after you receive life-support methods? Would you want to be able to walk? To speak? To eat on your own? To live without the help of machines? · If you have a choice, where do you want to be cared for? In your home? At a hospital or nursing home? · Do you want certain Synagogue practices performed if you become very ill? · If you have a choice at the end of your life, where would you prefer to die? At home? In a hospital or nursing home? Somewhere else? · Would you prefer to be buried or cremated? · Do you want your organs to be donated after you die? What should you do with your living will? · Make sure that your family members and your health care agent have copies of your living will. · Give your doctor a copy of your living will to keep in your medical record. If you have more than one doctor, make sure that each one has a copy. · You may want to put a copy of your living will where it can be easily found. Where can you learn more? Go to http://pauly-shantel.info/. Enter Y212 in the search box to learn more about \"Learning About Living Perpaddy. \"  Current as of: August 8, 2016  Content Version: 11.3  © 9458-2334 [employer], [employer]. Care instructions adapted under license by [employer] (which disclaims liability or warranty for this information). If you have questions about a medical condition or this instruction, always ask your healthcare professional. [address] any warranty or liability for your use of this information. Patient appears to be well controlled from a seizure standpoint and will not manipulate medicine. We will see him back in 1 year and will check blood work at that time.

## 2017-09-28 NOTE — MR AVS SNAPSHOT
Visit Information Date & Time Provider Department Dept. Phone Encounter #  
 9/28/2017  1:40 PM Moises Medrano MD [facility] [phone] [id number] Follow-up Instructions Return in about 1 year (around 9/28/2018). Follow-up and Disposition History Upcoming Health Maintenance Date Due DTaP/Tdap/Td series (1 - Tdap) 11/27/1996 INFLUENZA AGE 9 TO ADULT 8/1/2017 Allergies as of 9/28/2017  Review Complete On: 9/28/2017 By: Moises Medrano MD  
 No Known Allergies Current Immunizations  Never Reviewed Name Date  
 TB Skin Test (PPD) Intradermal 4/5/2017 Not reviewed this visit You Were Diagnosed With   
  
 Codes Comments Localization-related (focal) (partial) idiopathic epilepsy and epileptic syndromes with seizures of localized onset, not intractable, without status epilepticus ([facility]    -  Primary ICD-10-CM: G40.009 ICD-9-CM: 345.50 Vitals BP Pulse Temp Resp Height(growth percentile) Weight(growth percentile) 116/72 (!) 107 99 °F (37.2 °C) (Oral) 16 5' 6\" (1.676 m) 150 lb (68 kg) SpO2 BMI Smoking Status 98% 24.21 kg/m2 Never Smoker Vitals History BMI and BSA Data Body Mass Index Body Surface Area  
 24.21 kg/m 2 1.78 m 2 Preferred Pharmacy Pharmacy Name Phone [facility]  [phone] Your Updated Medication List  
  
   
This list is accurate as of: 9/28/17  2:02 PM.  Always use your most recent med list.  
  
  
  
  
 acetaminophen 500 mg tablet Commonly known as:  TYLENOL Take 1 Tab by mouth every six (6) hours as needed for Pain. Indications: HEADACHE DISORDER, Pain  
  
 benzoyl peroxide-erythromycin 3-5 % topical gel Commonly known as:  Pennelope Frost Apply  to affected area two (2) times daily as needed (acne). busPIRone 10 mg tablet Commonly known as:  BUSPAR  
 Take 20 mg by mouth two (2) times a day. * cloBAZam 10 mg Tab tablet Commonly known as:  ONFI Take 0.5 Tabs by mouth daily. Max Daily Amount: 5 mg. * cloBAZam 10 mg Tab tablet Commonly known as:  ONFI Take 1 Tab by mouth nightly. Max Daily Amount: 10 mg. COLACE 100 mg capsule Generic drug:  docusate sodium Take 100 mg by mouth two (2) times a day. * divalproex  mg tablet Commonly known as:  DEPAKOTE Take 1 Tab by mouth two (2) times a day. * divalproex  mg tablet Commonly known as:  DEPAKOTE Take 1 Tab by mouth nightly. lacosamide 200 mg Tab tablet Commonly known as:  VIMPAT Take 1 Tab by mouth two (2) times a day. Max Daily Amount: 400 mg.  
  
 lamoTRIgine 100 mg tablet Commonly known as: LaMICtal  
Take 1 Tab by mouth two (2) times a day. LORazepam 1 mg tablet Commonly known as:  ATIVAN Take 1 mg by mouth every six (6) hours as needed for Anxiety. meclizine 25 mg tablet Commonly known as:  DRAMAMINE LESS DROWSY Take 1 Tab by mouth three (3) times daily as needed. For nausea RisperDAL 1 mg tablet Generic drug:  risperiDONE Take 1 mg by mouth nightly. * Notice: This list has 4 medication(s) that are the same as other medications prescribed for you. Read the directions carefully, and ask your doctor or other care provider to review them with you. Follow-up Instructions Return in about 1 year (around 9/28/2018). Patient Instructions PRESCRIPTION REFILL POLICY [facility] Statement to Patients April 1, 2014 In an effort to ensure the large volume of patient prescription refills is processed in the most efficient and expeditious manner, we are asking our patients to assist us by calling your Pharmacy for all prescription refills, this will include also your  Mail Order Pharmacy.  The pharmacy will contact our office electronically to continue the refill process. Please do not wait until the last minute to call your pharmacy. We need at least 48 hours (2days) to fill prescriptions. We also encourage you to call your pharmacy before going to  your prescription to make sure it is ready. With regard to controlled substance prescription refill requests (narcotic refills) that need to be picked up at our office, we ask your cooperation by providing us with at least 72 hours (3days) notice that you will need a refill. We will not refill narcotic prescription refill requests after 4:00pm on any weekday, Monday through Thursday, or after 2:00pm on Fridays, or on the weekends. We encourage everyone to explore another way of getting your prescription refill request processed using [employer], our patient web portal through our electronic medical record system. [employer] is an efficient and effective way to communicate your medication request directly to the office and  downloadable as an guadalupe on your smart phone . [employer] also features a review functionality that allows you to view your medication list as well as leave messages for your physician. Are you ready to get connected? If so please review the attatched instructions or speak to any of our staff to get you set up right away! Thank you so much for your cooperation. Should you have any questions please contact our Practice Administrator. The Physicians and Staff,  [facility] Tricia [address]avez [address] What is a living will? A living will is a legal form you use to write down the kind of care you want at the end of your life. It is used by the health professionals who will treat you if you aren't able to decide for yourself. If you put your wishes in writing, your loved ones and others will know what kind of care you want. They won't need to guess. This can ease your mind and be helpful to others. A living will is not the same as an estate or property will. An estate will explains what you want to happen with your money and property after you die. Is a living will a legal document? A living will is a legal document. Each state has its own laws about living patten. If you move to another state, make sure that your living will is legal in the state where you now live. Or you might use a universal form that has been approved by many states. This kind of form can sometimes be completed and stored online. Your electronic copy will then be available wherever you have a connection to the Internet. In most cases, doctors will respect your wishes even if you have a form from a different state. · You don't need an  to complete a living will. But legal advice can be helpful if your state's laws are unclear, your health history is complicated, or your family can't agree on what should be in your living will. · You can change your living will at any time. Some people find that their wishes about end-of-life care change as their health changes. · In addition to making a living will, think about completing a medical power of  form. This form lets you name the person you want to make end-of-life treatment decisions for you (your \"health care agent\") if you're not able to. Many hospitals and nursing homes will give you the forms you need to complete a living will and a medical power of . · Your living will is used only if you can't make or communicate decisions for yourself anymore. If you become able to make decisions again, you can accept or refuse any treatment, no matter what you wrote in your living will. · Your state may offer an online registry. This is a place where you can store your living will online so the doctors and nurses who need to treat you can find it right away. What should you think about when creating a living will? Talk about your end-of-life wishes with your family members and your doctor. Let them know what you want. That way the people making decisions for you won't be surprised by your choices. Think about these questions as you make your living will: · Do you know enough about life support methods that might be used? If not, talk to your doctor so you know what might be done if you can't breathe on your own, your heart stops, or you're unable to swallow. · What things would you still want to be able to do after you receive life-support methods? Would you want to be able to walk? To speak? To eat on your own? To live without the help of machines? · If you have a choice, where do you want to be cared for? In your home? At a hospital or nursing home? · Do you want certain Confucianist practices performed if you become very ill? · If you have a choice at the end of your life, where would you prefer to die? At home? In a hospital or nursing home? Somewhere else? · Would you prefer to be buried or cremated? · Do you want your organs to be donated after you die? What should you do with your living will? · Make sure that your family members and your health care agent have copies of your living will. · Give your doctor a copy of your living will to keep in your medical record. If you have more than one doctor, make sure that each one has a copy. · You may want to put a copy of your living will where it can be easily found. Where can you learn more? Go to http://pauly-shantel.info/. Enter Z771 in the search box to learn more about \"Learning About Living Marleny Arroyo. \" Current as of: August 8, 2016 Content Version: 11.3 © 1837-3337 [employer]. Care instructions adapted under license by [employer] (which disclaims liability or warranty for this information).  If you have questions about a medical condition or this instruction, always ask your healthcare professional. [employer] disclaims any warranty or liability for your use of this information. Patient appears to be well controlled from a seizure standpoint and will not manipulate medicine. We will see him back in 1 year and will check blood work at that time. Patient Instructions History Introducing [facility]! Velia Eitan introduces [employer] patient portal. Now you can access parts of your medical record, email your doctor's office, and request medication refills online. 1. In your internet browser, go to [email or web address] 2. Click on the First Time User? Click Here link in the Sign In box. You will see the New Member Sign Up page. 3. Enter your [employer] Access Code exactly as it appears below. You will not need to use this code after youve completed the sign-up process. If you do not sign up before the expiration date, you must request a new code. · [employer] Access Code: [email or web address] Expires: 12/27/2017  2:02 PM 
 
4. Enter the last four digits of your Social Security Number (xxxx) and Date of Birth (mm/dd/yyyy) as indicated and click Submit. You will be taken to the next sign-up page. 5. Create a [employer]ap ID. This will be your [employer]ap login ID and cannot be changed, so think of one that is secure and easy to remember. 6. Create a [employer] password. You can change your password at any time. 7. Enter your Password Reset Question and Answer. This can be used at a later time if you forget your password. 8. Enter your e-mail address. You will receive e-mail notification when new information is available in [address] 9. Click Sign Up. You can now view and download portions of your medical record. 10. Click the Download Summary menu link to download a portable copy of your medical information. If you have questions, please visit the Frequently Asked Questions section of the [employer] website.  Remember, [employer] is NOT to be used for urgent needs. For medical emergencies, dial 911. Now available from your iPhone and Android! Please provide this summary of care documentation to your next provider. Your primary care clinician is listed as NIMESH CRONIN. If you have any questions after today's visit, please call [phone].

## 2017-09-28 NOTE — PROGRESS NOTES
Neurology Consult      Subjective:      Jake Murguia is a 89 [facility] y.o. male Who returns with his attendant from a local group home. As far as the attended is concerned there have been no witnessed seizures. Has had a previous EEG and labeled left temporal lobe seizure from September 2016. Had by report witnessed left temporal sharps with phase reversal left frontal.  As previously mentioned his labs from March 2017 were good. By way of recall is on Lamictal 100 mg twice daily Depakote 250 mg twice daily and 125 mg nightly. Is on Vimpat 200 mg twice daily and Onfi 5 mg in the day and 10 mg at night. There is no report of any new medical or surgical history and as I recall his first encounter from 6 months ago he is clearly at his baseline. The attendant agrees and as mentioned from the last visit if everything was in order and non-controversial with suggest an annual revisit at this point with updated labs in 1 year. Current Outpatient Prescriptions   Medication Sig Dispense Refill    docusate sodium (COLACE) 100 mg capsule Take 100 mg by mouth two (2) times a day.  meclizine (DRAMAMINE LESS DROWSY) 25 mg tablet Take 1 Tab by mouth three (3) times daily as needed. For nausea 10 Tab 0    acetaminophen (TYLENOL) 500 mg tablet Take 1 Tab by mouth every six (6) hours as needed for Pain. Indications: HEADACHE DISORDER, Pain 60 Tab 1    lamoTRIgine (LAMICTAL) 100 mg tablet Take 1 Tab by mouth two (2) times a day. 180 Tab 3    cloBAZam (ONFI) 10 mg tab tablet Take 0.5 Tabs by mouth daily. Max Daily Amount: 5 mg. 90 Tab 3    cloBAZam (ONFI) 10 mg tab tablet Take 1 Tab by mouth nightly. Max Daily Amount: 10 mg. 90 Tab 3    lacosamide (VIMPAT) 200 mg tab tablet Take 1 Tab by mouth two (2) times a day. Max Daily Amount: 400 mg. 180 Tab 3    divalproex DR (DEPAKOTE) 250 mg tablet Take 1 Tab by mouth two (2) times a day. 180 Tab 3    divalproex DR (DEPAKOTE) 125 mg tablet Take 1 Tab by mouth nightly.  90 Tab 3    busPIRone (BUSPAR) 10 mg tablet Take 20 mg by mouth two (2) times a day.  risperiDONE (RISPERDAL) 1 mg tablet Take 1 mg by mouth nightly.  LORazepam (ATIVAN) 1 mg tablet Take 1 mg by mouth every six (6) hours as needed for Anxiety.  benzoyl peroxide-erythromycin (BENZAMYCIN) 3-5 % topical gel Apply  to affected area two (2) times daily as needed (acne). 46.6 g 11      No Known Allergies  Past Medical History:   Diagnosis Date    Blindness     Cerebral palsy (HCC)     Intellectual disability     Schizophrenia, undifferentiated (HCC)     Seizure disorder ([facility] [address]       No past surgical history on file. Social History     Social History    Marital status: SINGLE     Spouse name: N/A    Number of children: N/A    Years of education: N/A     Occupational History    Not on file. Social History Main Topics    Smoking status: Never Smoker    Smokeless tobacco: Never Used    Alcohol use No    Drug use: No    Sexual activity: Not Currently     Other Topics Concern    Not on file     Social History Narrative      Family History   Problem Relation Age of Onset    Family history unknown: Yes      Visit Vitals    /72    Pulse (!) 107    Temp 99 °F (37.2 °C) (Oral)    Resp 16    Ht 5' 6\" (1.676 m)    Wt 68 kg (150 lb)    SpO2 98%    BMI 24.21 kg/m2        Review of Systems:   A comprehensive review of systems was negative except for that written in the HPI. Neuro Exam:     Appearance: The patient is well developed, well nourished, and unable to provide a coherent history and is in no acute distress. Drooling today and maintains mouth open posture. Mental Status: Oriented to name only and his date of birth today. Mood and affect appropriate. Cranial Nerves:   Intact visual fields. Fundi are benign. NANCIE, EOM's full, no nystagmus, no ptosis. Facial sensation is normal. Corneal reflexes are intact. Facial movement is symmetric. Hearing is normal bilaterally.  Palate is midline with normal sternocleidomastoid and trapezius muscles are normal. Tongue is midline. Motor:  5/5 strength in upper and lower proximal and distal muscles. Normal bulk and tone. No fasciculations. Reflexes:   Deep tendon reflexes 2+/4 and symmetrical.   Sensory:   Normal to touch, pinprick and vibration. Gait:   Slow cautious transfers and gait but functional    Tremor:   No tremor noted. Cerebellar:  No cerebellar signs present. Neurovascular:  Normal heart sounds and regular rhythm, peripheral pulses intact, and no carotid bruits. 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 Will not manipulate medicine at this time. As promised on last revisit if there was no controversy would suggest a one-year revisit from today. Patient appears to be at his baseline compared to 6 months ago and will check blood work in 1 year. Plan:   Revisit 1 year.   Signed by :  Rhea Balderrama MD

## 2018-03-02 DIAGNOSIS — K59.00 CONSTIPATION, UNSPECIFIED CONSTIPATION TYPE: ICD-10-CM

## 2018-03-05 RX ORDER — DOCUSATE SODIUM 100 MG/1
CAPSULE, LIQUID FILLED ORAL
Qty: 30 CAP | Refills: 0 | Status: SHIPPED | OUTPATIENT
Start: 2018-03-05 | End: 2018-04-03 | Stop reason: SDUPTHER

## 2018-04-02 RX ORDER — MECLIZINE HCL 25MG 25 MG/1
TABLET, CHEWABLE ORAL
Qty: 10 TAB | Refills: 0 | Status: SHIPPED | OUTPATIENT
Start: 2018-04-02 | End: 2019-08-06

## 2018-04-03 DIAGNOSIS — K59.00 CONSTIPATION, UNSPECIFIED CONSTIPATION TYPE: ICD-10-CM

## 2018-04-04 RX ORDER — DOCUSATE SODIUM 100 MG/1
CAPSULE, LIQUID FILLED ORAL
Qty: 30 CAP | Refills: 0 | Status: SHIPPED | OUTPATIENT
Start: 2018-04-04 | End: 2018-05-02 | Stop reason: SDUPTHER

## 2018-04-11 ENCOUNTER — OFFICE VISIT (OUTPATIENT)
Dept: FAMILY MEDICINE CLINIC | Age: 43
End: 2018-04-11

## 2018-04-11 ENCOUNTER — HOSPITAL ENCOUNTER (OUTPATIENT)
Dept: LAB | Age: 43
Discharge: HOME | End: 2018-04-11
Payer: MEDICARE

## 2018-04-11 VITALS
BODY MASS INDEX: 23.3 KG/M2 | TEMPERATURE: 97.7 F | WEIGHT: 145 LBS | RESPIRATION RATE: 16 BRPM | SYSTOLIC BLOOD PRESSURE: 107 MMHG | HEIGHT: 66 IN | HEART RATE: 76 BPM | OXYGEN SATURATION: 99 % | DIASTOLIC BLOOD PRESSURE: 71 MMHG

## 2018-04-11 DIAGNOSIS — Z00.00 ROUTINE GENERAL MEDICAL EXAMINATION AT A HEALTH CARE FACILITY: Primary | ICD-10-CM

## 2018-04-11 DIAGNOSIS — R56.9 SEIZURE (HCC): ICD-10-CM

## 2018-04-11 DIAGNOSIS — F71 MR (MENTAL RETARDATION), MODERATE: ICD-10-CM

## 2018-04-11 DIAGNOSIS — Z11.1 SCREENING FOR TUBERCULOSIS: ICD-10-CM

## 2018-04-11 LAB
MM INDURATION POC: NORMAL MM (ref 0–5)
PPD POC: NORMAL NEGATIVE

## 2018-04-11 PROCEDURE — 80053 COMPREHEN METABOLIC PANEL: CPT

## 2018-04-11 PROCEDURE — 80164 ASSAY DIPROPYLACETIC ACD TOT: CPT

## 2018-04-11 PROCEDURE — 85025 COMPLETE CBC W/AUTO DIFF WBC: CPT

## 2018-04-11 PROCEDURE — 80175 DRUG SCREEN QUAN LAMOTRIGINE: CPT

## 2018-04-11 NOTE — MR AVS SNAPSHOT
[address] [address] 
[phone] Patient: Ming Gomez MRN: [id number] :[id number] Visit Information Date & Time Provider Department Dept. Phone Encounter #  
 2018 11:00 AM Carl Castellanos NP [facility] [phone] [id number] Follow-up Instructions Return if symptoms worsen or fail to improve. Your Appointments 2018 11:00 AM  
PHYSICAL PRE OP with Carl Castellanos NP [facility] ([facility]) Appt Note: group home cpe  
 [address] [address] [address]  
[phone]  
  
   
 [address] [address] [address]  
  
    
 2018  1:40 PM  
Follow Up with Usha Robertson MD  
[address] [facility]) Appt Note: follow  up seizures  $0CP  suyapa  17  
 [address] [place] [address] [id number] [place] [address] [address] [address]  
[facility] [address] [place] [address] Upcoming Health Maintenance Date Due DTaP/Tdap/Td series (1 - Tdap) 1996 Influenza Age 5 to Adult 2017 MEDICARE YEARLY EXAM 2018 Allergies as of 2018  Review Complete On: 2018 By: Robb Chavarria LPN No Known Allergies Current Immunizations  Never Reviewed Name Date  
 TB Skin Test (PPD) Intradermal  Incomplete, 2017 Not reviewed this visit You Were Diagnosed With   
  
 Codes Comments Routine general medical examination at a health care facility    -  Primary ICD-10-CM: Z00.00 ICD-9-CM: V70.0 Seizure ([address]     ICD-10-CM: R56.9 ICD-9-CM: 780.39   
 MR (mental retardation), moderate     ICD-10-CM: F71 
ICD-9-CM: 318.0 Screening for tuberculosis     ICD-10-CM: Z11.1 ICD-9-CM: V74.1 Vitals BP Pulse Temp Resp Height(growth percentile) Weight(growth percentile) 107/71 76 97.7 °F (36.5 °C) (Oral) 16 5' 6\" (1.676 m) 145 lb (65.8 kg) SpO2 BMI Smoking Status 99% 23.4 kg/m2 Never Smoker BMI and BSA Data Body Mass Index Body Surface Area  
 23.4 kg/m 2 1.75 m 2 Preferred Pharmacy Pharmacy Name Phone [facility] [phone] Your Updated Medication List  
  
   
This list is accurate as of 4/11/18 10:28 AM.  Always use your most recent med list.  
  
  
  
  
 acetaminophen 500 mg tablet Commonly known as:  TYLENOL Take 1 Tab by mouth every six (6) hours as needed for Pain. Indications: HEADACHE DISORDER, Pain  
  
 benzoyl peroxide-erythromycin 3-5 % topical gel Commonly known as:  Jonetta Crease Apply  to affected area two (2) times daily as needed (acne). busPIRone 10 mg tablet Commonly known as:  BUSPAR Take 20 mg by mouth two (2) times a day. * cloBAZam 10 mg Tab tablet Commonly known as:  ONFI Take 0.5 Tabs by mouth daily. Max Daily Amount: 5 mg. * cloBAZam 10 mg Tab tablet Commonly known as:  ONFI Take 1 Tab by mouth nightly. Max Daily Amount: 10 mg.  
  
 * COLACE 100 mg capsule Generic drug:  docusate sodium Take 100 mg by mouth two (2) times a day. *  mg capsule Generic drug:  docusate sodium TAKE ONE CAPSULE BY MOUTH DAILY * divalproex  mg tablet Commonly known as:  DEPAKOTE  
TAKE ONE TABLET BY MOUTH AT BEDTIME. * divalproex  mg tablet Commonly known as:  DEPAKOTE  
TAKE 1 TABLET BY MOUTH TWICE DAILY. lacosamide 200 mg Tab tablet Commonly known as:  VIMPAT Take 1 Tab by mouth two (2) times a day. Max Daily Amount: 400 mg.  
  
 lamoTRIgine 100 mg tablet Commonly known as: LaMICtal  
TAKE 1 TABLET BY MOUTH TWICE DAILY. LORazepam 1 mg tablet Commonly known as:  ATIVAN Take 1 mg by mouth every six (6) hours as needed for Anxiety. meclizine 25 mg chewable tablet Commonly known as:  ANTIVERT  
 TAKE (1) TABLET BY MOUTH THREE TIMES DAILY AS NEEDED FOR NAUSEA RisperDAL 1 mg tablet Generic drug:  risperiDONE Take 1 mg by mouth nightly. * Notice: This list has 6 medication(s) that are the same as other medications prescribed for you. Read the directions carefully, and ask your doctor or other care provider to review them with you. We Performed the Following AMB POC TUBERCULOSIS, INTRADERMAL (SKIN TEST) [18272 CPT(R)] CBC WITH AUTOMATED DIFF [85717 CPT(R)] LAMOTRIGINE (LAMICTAL) [12254 CPT(R)] METABOLIC PANEL, COMPREHENSIVE [89859 CPT(R)] VALPROIC ACID [93876 CPT(R)] Follow-up Instructions Return if symptoms worsen or fail to improve. Patient Instructions Well Visit, Ages 25 to 48: Care Instructions Your Care Instructions Physical exams can help you stay healthy. Your doctor has checked your overall health and may have suggested ways to take good care of yourself. He or she also may have recommended tests. At home, you can help prevent illness with healthy eating, regular exercise, and other steps. Follow-up care is a key part of your treatment and safety. Be sure to make and go to all appointments, and call your doctor if you are having problems. It's also a good idea to know your test results and keep a list of the medicines you take. How can you care for yourself at home? · Reach and stay at a healthy weight. This will lower your risk for many problems, such as obesity, diabetes, heart disease, and high blood pressure. · Get at least 30 minutes of physical activity on most days of the week. Walking is a good choice. You also may want to do other activities, such as running, swimming, cycling, or playing tennis or team sports. Discuss any changes in your exercise program with your doctor. · Do not smoke or allow others to smoke around you. If you need help quitting, talk to your doctor about stop-smoking programs and medicines. These can increase your chances of quitting for good. · Talk to your doctor about whether you have any risk factors for sexually transmitted infections (STIs). Having one sex partner (who does not have STIs and does not have sex with anyone else) is a good way to avoid these infections. · Use birth control if you do not want to have children at this time. Talk with your doctor about the choices available and what might be best for you. · Protect your skin from too much sun. When you're outdoors from 10 a.m. to 4 p.m., stay in the shade or cover up with clothing and a hat with a wide brim. Wear sunglasses that block UV rays. Even when it's cloudy, put broad-spectrum sunscreen (SPF 30 or higher) on any exposed skin. · See a dentist one or two times a year for checkups and to have your teeth cleaned. · Wear a seat belt in the car. · Drink alcohol in moderation, if at all. That means no more than 2 drinks a day for men and 1 drink a day for women. Follow your doctor's advice about when to have certain tests. These tests can spot problems early. For everyone · Cholesterol. Have the fat (cholesterol) in your blood tested after age 21. Your doctor will tell you how often to have this done based on your age, family history, or other things that can increase your risk for heart disease. · Blood pressure. Have your blood pressure checked during a routine doctor visit. Your doctor will tell you how often to check your blood pressure based on your age, your blood pressure results, and other factors. · Vision. Talk with your doctor about how often to have a glaucoma test. 
· Diabetes. Ask your doctor whether you should have tests for diabetes. · Colon cancer. Have a test for colon cancer at age 48. You may have one of several tests. If you are younger than 48, you may need a test earlier if you have any risk factors.  Risk factors include whether you already had a precancerous polyp removed from your colon or whether your parent, brother, sister, or child has had colon cancer. For women · Breast exam and mammogram. Talk to your doctor about when you should have a clinical breast exam and a mammogram. Medical experts differ on whether and how often women under 50 should have these tests. Your doctor can help you decide what is right for you. · Pap test and pelvic exam. Begin Pap tests at age 24. A Pap test is the best way to find cervical cancer. The test often is part of a pelvic exam. Ask how often to have this test. 
· Tests for sexually transmitted infections (STIs). Ask whether you should have tests for STIs. You may be at risk if you have sex with more than one person, especially if your partners do not wear condoms. For men · Tests for sexually transmitted infections (STIs). Ask whether you should have tests for STIs. You may be at risk if you have sex with more than one person, especially if you do not wear a condom. · Testicular cancer exam. Ask your doctor whether you should check your testicles regularly. · Prostate exam. Talk to your doctor about whether you should have a blood test (called a PSA test) for prostate cancer. Experts differ on whether and when men should have this test. Some experts suggest it if you are older than 39 and are -American or have a father or brother who got prostate cancer when he was younger than 72. When should you call for help? Watch closely for changes in your health, and be sure to contact your doctor if you have any problems or symptoms that concern you. Where can you learn more? Go to http://pauly-shantel.info/. Enter P072 in the search box to learn more about \"Well Visit, Ages 25 to 48: Care Instructions. \" Current as of: May 12, 2017 Content Version: 11.4 © 8878-8112 [employer].  Care instructions adapted under license by [address] (which disclaims liability or warranty for this information). If you have questions about a medical condition or this instruction, always ask your healthcare professional. [address] any warranty or liability for your use of this information. Introducing \[facility]\"" & HEALTH SERVICES! [employer] introduces Sendmybag patient portal. Now you can access parts of your medical record, email your doctor's office, and request medication refills online. 1. In your internet browser, go to [email or web address] 2. Click on the First Time User? Click Here link in the Sign In box. You will see the New Member Sign Up page. 3. Enter your Sendmybag Access Code exactly as it appears below. You will not need to use this code after youve completed the sign-up process. If you do not sign up before the expiration date, you must request a new code. · Sendmybag Access Code: [email or web address] Expires: 7/10/2018 10:28 AM 
 
4. Enter the last four digits of your Social Security Number (xxxx) and Date of Birth (mm/dd/yyyy) as indicated and click Submit. You will be taken to the next sign-up page. 5. Create a Sendmybag ID. This will be your Sendmybag login ID and cannot be changed, so think of one that is secure and easy to remember. 6. Create a Sendmybag password. You can change your password at any time. 7. Enter your Password Reset Question and Answer. This can be used at a later time if you forget your password. 8. Enter your e-mail address. You will receive e-mail notification when new information is available in [address] 9. Click Sign Up. You can now view and download portions of your medical record. 10. Click the Download Summary menu link to download a portable copy of your medical information. If you have questions, please visit the Frequently Asked Questions section of the Sendmybag website.  Remember, Sendmybag is NOT to be used for urgent needs. For medical emergencies, dial 911. Now available from your iPhone and Android! Please provide this summary of care documentation to your next provider. Your primary care clinician is listed as NIMESH CRONIN. If you have any questions after today's visit, please call [phone].

## 2018-04-11 NOTE — PROGRESS NOTES
1. Have you been to the ER, urgent care clinic since your last visit? Hospitalized since your last visit? No    2. Have you seen or consulted any other health care providers outside of the [address] [place] [address] since your last visit? Include any pap smears or colon screening.  No     Chief Complaint   Patient presents with    Complete Physical    Labs     Non Fasting

## 2018-04-11 NOTE — PROGRESS NOTES
This is the Subsequent Medicare Annual Wellness Exam, performed 12 months or more after the Initial AWV or the last Subsequent AWV    I have reviewed the patient's medical history in detail and updated the computerized patient record. History     Past Medical History:   Diagnosis Date    Blindness     [facility]     Intellectual disability     Schizophrenia, undifferentiated ([facility]     Seizure disorder ([facility]       History reviewed. No pertinent surgical history. Current Outpatient Prescriptions   Medication Sig Dispense Refill    divalproex DR (DEPAKOTE) 125 mg tablet TAKE ONE TABLET BY MOUTH AT BEDTIME. 30 Tab 5    divalproex DR (DEPAKOTE) 250 mg tablet TAKE 1 TABLET BY MOUTH TWICE DAILY. 60 Tab 5     mg capsule TAKE ONE CAPSULE BY MOUTH DAILY 30 Cap 0    lamoTRIgine (LAMICTAL) 100 mg tablet TAKE 1 TABLET BY MOUTH TWICE DAILY. 60 Tab 5    meclizine (ANTIVERT) 25 mg chewable tablet TAKE (1) TABLET BY MOUTH THREE TIMES DAILY AS NEEDED FOR NAUSEA 10 Tab 0    docusate sodium (COLACE) 100 mg capsule Take 100 mg by mouth two (2) times a day.  acetaminophen (TYLENOL) 500 mg tablet Take 1 Tab by mouth every six (6) hours as needed for Pain. Indications: HEADACHE DISORDER, Pain 60 Tab 1    cloBAZam (ONFI) 10 mg tab tablet Take 0.5 Tabs by mouth daily. Max Daily Amount: 5 mg. 90 Tab 3    cloBAZam (ONFI) 10 mg tab tablet Take 1 Tab by mouth nightly. Max Daily Amount: 10 mg. 90 Tab 3    lacosamide (VIMPAT) 200 mg tab tablet Take 1 Tab by mouth two (2) times a day. Max Daily Amount: 400 mg. 180 Tab 3    busPIRone (BUSPAR) 10 mg tablet Take 20 mg by mouth two (2) times a day.  risperiDONE (RISPERDAL) 1 mg tablet Take 1 mg by mouth nightly.  LORazepam (ATIVAN) 1 mg tablet Take 1 mg by mouth every six (6) hours as needed for Anxiety.  benzoyl peroxide-erythromycin (BENZAMYCIN) 3-5 % topical gel Apply  to affected area two (2) times daily as needed (acne).  46.6 g 11     No Known Allergies  Family History   Problem Relation Age of Onset    Family history unknown: Yes     Social History   Substance Use Topics    Smoking status: Never Smoker    Smokeless tobacco: Never Used    Alcohol use No     Patient Active Problem List   Diagnosis Code    Constipation K59.00    MR (mental retardation), moderate F71    Acne L70.9    Seizure ([facility] R56.9     Depression Risk Factor Screening:     PHQ over the last two weeks 4/11/2018   Little interest or pleasure in doing things Not at all   Feeling down, depressed or hopeless Not at all   Total Score PHQ 2 0     Alcohol Risk Factor Screening: You do not drink alcohol or very rarely. Functional Ability and Level of Safety:   Hearing Loss  Hearing is good. Activities of Daily Living  The home contains: handrails and grab bars  Patient needs help with:  phone, transportation, shopping, preparing meals, laundry, housework, managing medications, managing money, bathing and hygiene    Fall Risk  No flowsheet data found. Abuse Screen  Patient is not abused    Cognitive Screening   Evaluation of Cognitive Function:  Has your family/caregiver stated any concerns about your memory: no  Abnormal but unchanged from prior exams     Patient Care Team   Patient Care Team:  Caren Paulino MD as PCP - General (Family Practice)    Assessment/Plan   Education and counseling provided:  End-of-Life planning (with patient's consent)  Screening for glaucoma  Diabetes screening test    Diagnoses and all orders for this visit:    1. Routine general medical examination at a health care facility    2. Seizure (HCC)  -     LAMOTRIGINE (LAMICTAL)  -     VALPROIC ACID  -     CBC WITH AUTOMATED DIFF  -     METABOLIC PANEL, COMPREHENSIVE  Stable. Sees Neuro. 3. MR (mental retardation), moderate  -     CBC WITH AUTOMATED DIFF  -     METABOLIC PANEL, COMPREHENSIVE    4.  Screening for tuberculosis  -     AMB POC TUBERCULOSIS, INTRADERMAL (SKIN TEST)    There are no preventive care reminders to display for this patient.     Bartolo Mcmahan, NP

## 2018-04-11 NOTE — PATIENT INSTRUCTIONS

## 2018-04-12 LAB
ALBUMIN SERPL-MCNC: 4.4 G/DL (ref 3.5–5.5)
ALBUMIN/GLOB SERPL: 1.6 {RATIO} (ref 1.2–2.2)
ALP SERPL-CCNC: 85 IU/L (ref 39–117)
ALT SERPL-CCNC: 15 IU/L (ref 0–44)
AST SERPL-CCNC: 17 IU/L (ref 0–40)
BASOPHILS # BLD AUTO: 0 X10E3/UL (ref 0–0.2)
BASOPHILS NFR BLD AUTO: 0 %
BILIRUB SERPL-MCNC: 0.2 MG/DL (ref 0–1.2)
BUN SERPL-MCNC: 9 MG/DL (ref 6–24)
BUN/CREAT SERPL: 15 (ref 9–20)
CALCIUM SERPL-MCNC: 9.4 MG/DL (ref 8.7–10.2)
CHLORIDE SERPL-SCNC: 102 MMOL/L (ref 96–106)
CO2 SERPL-SCNC: 26 MMOL/L (ref 18–29)
CREAT SERPL-MCNC: 0.62 MG/DL (ref 0.76–1.27)
EOSINOPHIL # BLD AUTO: 0 X10E3/UL (ref 0–0.4)
EOSINOPHIL NFR BLD AUTO: 1 %
ERYTHROCYTE [DISTWIDTH] IN BLOOD BY AUTOMATED COUNT: 15.1 % (ref 12.3–15.4)
GFR SERPLBLD CREATININE-BSD FMLA CKD-EPI: 122 ML/MIN/1.73
GFR SERPLBLD CREATININE-BSD FMLA CKD-EPI: 141 ML/MIN/1.73
GLOBULIN SER CALC-MCNC: 2.7 G/DL (ref 1.5–4.5)
GLUCOSE SERPL-MCNC: 90 MG/DL (ref 65–99)
HCT VFR BLD AUTO: 38.6 % (ref 37.5–51)
HGB BLD-MCNC: 13.3 G/DL (ref 13–17.7)
IMM GRANULOCYTES # BLD: 0 X10E3/UL (ref 0–0.1)
IMM GRANULOCYTES NFR BLD: 0 %
LAMOTRIGINE SERPL-MCNC: 7.7 UG/ML (ref 2–20)
LYMPHOCYTES # BLD AUTO: 1.8 X10E3/UL (ref 0.7–3.1)
LYMPHOCYTES NFR BLD AUTO: 35 %
MCH RBC QN AUTO: 30.4 PG (ref 26.6–33)
MCHC RBC AUTO-ENTMCNC: 34.5 G/DL (ref 31.5–35.7)
MCV RBC AUTO: 88 FL (ref 79–97)
MONOCYTES # BLD AUTO: 0.6 X10E3/UL (ref 0.1–0.9)
MONOCYTES NFR BLD AUTO: 12 %
NEUTROPHILS # BLD AUTO: 2.7 X10E3/UL (ref 1.4–7)
NEUTROPHILS NFR BLD AUTO: 52 %
PLATELET # BLD AUTO: 174 X10E3/UL (ref 150–379)
POTASSIUM SERPL-SCNC: 3.8 MMOL/L (ref 3.5–5.2)
PROT SERPL-MCNC: 7.1 G/DL (ref 6–8.5)
RBC # BLD AUTO: 4.38 X10E6/UL (ref 4.14–5.8)
SODIUM SERPL-SCNC: 143 MMOL/L (ref 134–144)
VALPROATE SERPL-MCNC: 70 UG/ML (ref 50–100)
WBC # BLD AUTO: 5.2 X10E3/UL (ref 3.4–10.8)

## 2018-05-02 ENCOUNTER — OFFICE VISIT (OUTPATIENT)
Dept: FAMILY MEDICINE CLINIC | Age: 43
End: 2018-05-02

## 2018-05-02 VITALS
HEIGHT: 66 IN | HEART RATE: 82 BPM | SYSTOLIC BLOOD PRESSURE: 116 MMHG | RESPIRATION RATE: 16 BRPM | BODY MASS INDEX: 22.73 KG/M2 | OXYGEN SATURATION: 99 % | TEMPERATURE: 97.4 F | DIASTOLIC BLOOD PRESSURE: 79 MMHG | WEIGHT: 141.4 LBS

## 2018-05-02 DIAGNOSIS — S01.01XA LACERATION OF SCALP WITHOUT FOREIGN BODY, INITIAL ENCOUNTER: Primary | ICD-10-CM

## 2018-05-02 DIAGNOSIS — K59.00 CONSTIPATION, UNSPECIFIED CONSTIPATION TYPE: ICD-10-CM

## 2018-05-02 RX ORDER — DOCUSATE SODIUM 100 MG/1
CAPSULE, LIQUID FILLED ORAL
Qty: 30 CAP | Refills: 0 | Status: SHIPPED | OUTPATIENT
Start: 2018-05-02 | End: 2018-06-05 | Stop reason: SDUPTHER

## 2018-05-02 NOTE — PROGRESS NOTES
1. Have you been to the ER, urgent care clinic since your last visit? Hospitalized since your last visit? Yes When: 4/30/2018 Where: [address] Reason for visit: Head Injury    2. Have you seen or consulted any other health care providers outside of the [address] [place] [address] since your last visit? Include any pap smears or colon screening. No    Chief Complaint   Patient presents with    Head Injury     Pt f/u from ER. Pt states right side pain when moving and Headache yesterday.

## 2018-05-02 NOTE — MR AVS SNAPSHOT
[address] [address] [phone] Patient: Ming Gomez MRN: [id number] :1975 Visit Information Date & Time Provider Department Dept. Phone Encounter #  
 2018 10:40 AM [address] [phone] [id number] Follow-up Instructions Return if symptoms worsen or fail to improve. Your Appointments 2018 10:40 AM  
ESTABLISHED PATIENT with Nadia Lyons DO [address] [facility] ([address] [facility]) Appt Note: ER f/up, d/c , cs 18; [address] [address]  
[phone]  
  
   
 [address] [address] [address] [address]  
  
    
 2018  1:40 PM  
Follow Up with Usha Robertson MD  
[address] [facility] [address] [facility]) Appt Note: follow  up seizures  $0CP  suyapa  17  
 [address] [address]  
[facility] [address] [address] Upcoming Health Maintenance Date Due Influenza Age 5 to Adult 2018 MEDICARE YEARLY EXAM 2019 DTaP/Tdap/Td series (2 - Td) 2028 Allergies as of 2018  Review Complete On: 2018 By: Margi Paz Organ No Known Allergies Current Immunizations  Never Reviewed Name Date  
 TB Skin Test (PPD) Intradermal 2018, 2017 Not reviewed this visit You Were Diagnosed With   
  
 Codes Comments Laceration of scalp without foreign body, initial encounter    -  Primary ICD-10-CM: S01. Deb Alt ICD-9-CM: 873.0 Vitals BP Pulse Temp Resp Height(growth percentile) Weight(growth percentile) 116/79 (BP 1 Location: Right arm, BP Patient Position: Sitting) 82 97.4 °F (36.3 °C) (Oral) 16 5' 6\" (1.676 m) 141 lb 6.4 oz (64.1 kg) SpO2 BMI Smoking Status 99% 22.82 kg/m2 Never Smoker BMI and BSA Data Body Mass Index Body Surface Area  
 22.82 kg/m 2 1.73 m 2 Preferred Pharmacy Pharmacy Name Phone [facility] [phone] Your Updated Medication List  
  
   
This list is accurate as of 5/2/18 10:30 AM.  Always use your most recent med list.  
  
  
  
  
 acetaminophen 500 mg tablet Commonly known as:  TYLENOL Take 1 Tab by mouth every six (6) hours as needed for Pain. Indications: HEADACHE DISORDER, Pain  
  
 benzoyl peroxide-erythromycin 3-5 % topical gel Commonly known as:  Dorys Leys Apply  to affected area two (2) times daily as needed (acne). busPIRone 10 mg tablet Commonly known as:  BUSPAR Take 20 mg by mouth two (2) times a day. * cloBAZam 10 mg Tab tablet Commonly known as:  ONFI Take 0.5 Tabs by mouth daily. Max Daily Amount: 5 mg. * cloBAZam 10 mg Tab tablet Commonly known as:  ONFI Take 1 Tab by mouth nightly. Max Daily Amount: 10 mg.  
  
 * COLACE 100 mg capsule Generic drug:  docusate sodium Take 100 mg by mouth two (2) times a day. *  mg capsule Generic drug:  docusate sodium TAKE ONE CAPSULE BY MOUTH DAILY * divalproex  mg tablet Commonly known as:  DEPAKOTE  
TAKE ONE TABLET BY MOUTH AT BEDTIME. * divalproex  mg tablet Commonly known as:  DEPAKOTE  
TAKE 1 TABLET BY MOUTH TWICE DAILY. lacosamide 200 mg Tab tablet Commonly known as:  VIMPAT Take 1 Tab by mouth two (2) times a day. Max Daily Amount: 400 mg.  
  
 lamoTRIgine 100 mg tablet Commonly known as: LaMICtal  
TAKE 1 TABLET BY MOUTH TWICE DAILY. LORazepam 1 mg tablet Commonly known as:  ATIVAN Take 1 mg by mouth every six (6) hours as needed for Anxiety. meclizine 25 mg chewable tablet Commonly known as:  ANTIVERT  
TAKE (1) TABLET BY MOUTH THREE TIMES DAILY AS NEEDED FOR NAUSEA RisperDAL 1 mg tablet Generic drug:  risperiDONE  
 Take 1 mg by mouth nightly. * Notice: This list has 6 medication(s) that are the same as other medications prescribed for you. Read the directions carefully, and ask your doctor or other care provider to review them with you. Follow-up Instructions Return if symptoms worsen or fail to improve. Patient Instructions A Healthy Lifestyle: Care Instructions Your Care Instructions A healthy lifestyle can help you feel good, stay at a healthy weight, and have plenty of energy for both work and play. A healthy lifestyle is something you can share with your whole family. A healthy lifestyle also can lower your risk for serious health problems, such as high blood pressure, heart disease, and diabetes. You can follow a few steps listed below to improve your health and the health of your family. Follow-up care is a key part of your treatment and safety. Be sure to make and go to all appointments, and call your doctor if you are having problems. It's also a good idea to know your test results and keep a list of the medicines you take. How can you care for yourself at home? · Do not eat too much sugar, fat, or fast foods. You can still have dessert and treats now and then. The goal is moderation. · Start small to improve your eating habits. Pay attention to portion sizes, drink less juice and soda pop, and eat more fruits and vegetables. ¨ Eat a healthy amount of food. A 3-ounce serving of meat, for example, is about the size of a deck of cards. Fill the rest of your plate with vegetables and whole grains. ¨ Limit the amount of soda and sports drinks you have every day. Drink more water when you are thirsty. ¨ Eat at least 5 servings of fruits and vegetables every day. It may seem like a lot, but it is not hard to reach this goal. A serving or helping is 1 piece of fruit, 1 cup of vegetables, or 2 cups of leafy, raw vegetables. Have an apple or some carrot sticks as an afternoon snack instead of a candy bar. Try to have fruits and/or vegetables at every meal. 
· Make exercise part of your daily routine. You may want to start with simple activities, such as walking, bicycling, or slow swimming. Try to be active 30 to 60 minutes every day. You do not need to do all 30 to 60 minutes all at once. For example, you can exercise 3 times a day for 10 or 20 minutes. Moderate exercise is safe for most people, but it is always a good idea to talk to your doctor before starting an exercise program. 
· Keep moving. Julio Boron the lawn, work in the garden, or [email or web address]. Take the stairs instead of the elevator at work. · If you smoke, quit. People who smoke have an increased risk for heart attack, stroke, cancer, and other lung illnesses. Quitting is hard, but there are ways to boost your chance of quitting tobacco for good. ¨ Use nicotine gum, patches, or lozenges. ¨ Ask your doctor about stop-smoking programs and medicines. ¨ Keep trying. In addition to reducing your risk of diseases in the future, you will notice some benefits soon after you stop using tobacco. If you have shortness of breath or asthma symptoms, they will likely get better within a few weeks after you quit. · Limit how much alcohol you drink. Moderate amounts of alcohol (up to 2 drinks a day for men, 1 drink a day for women) are okay. But drinking too much can lead to liver problems, high blood pressure, and other health problems. Family health If you have a family, there are many things you can do together to improve your health. · Eat meals together as a family as often as possible. · Eat healthy foods. This includes fruits, vegetables, lean meats and dairy, and whole grains. · Include your family in your fitness plan.  Most people think of activities such as jogging or tennis as the way to fitness, but there are many ways you and your family can be more active. Anything that makes you breathe hard and gets your heart pumping is exercise. Here are some tips: 
¨ Walk to do errands or to take your child to school or the bus. ¨ Go for a family bike ride after dinner instead of watching TV. Where can you learn more? Go to http://pauly-shantel.info/. Enter A805 in the search box to learn more about \"A Healthy Lifestyle: Care Instructions. \" Current as of: May 12, 2017 Content Version: 11.4 © 3477-8465 [employer]. Care instructions adapted under license by [employer] (which disclaims liability or warranty for this information). If you have questions about a medical condition or this instruction, always ask your healthcare professional. [address] any warranty or liability for your use of this information. Introducing [facility] & HEALTH SERVICES! Trinidad Andres introduces [employer] patient portal. Now you can access parts of your medical record, email your doctor's office, and request medication refills online. 1. In your internet browser, go to [email or web address] 2. Click on the First Time User? Click Here link in the Sign In box. You will see the New Member Sign Up page. 3. Enter your [employer] Access Code exactly as it appears below. You will not need to use this code after youve completed the sign-up process. If you do not sign up before the expiration date, you must request a new code. · [employer] Access Code: [email or web address] Expires: 7/10/2018 10:28 AM 
 
4. Enter the last four digits of your Social Security Number (xxxx) and Date of Birth (mm/dd/yyyy) as indicated and click Submit. You will be taken to the next sign-up page. 5. Create a [employer] ID. This will be your [employer] login ID and cannot be changed, so think of one that is secure and easy to remember. 6. Create a [employer] password. You can change your password at any time. 7. Enter your Password Reset Question and Answer. This can be used at a later time if you forget your password. 8. Enter your e-mail address. You will receive e-mail notification when new information is available in [address] 9. Click Sign Up. You can now view and download portions of your medical record. 10. Click the Download Summary menu link to download a portable copy of your medical information. If you have questions, please visit the Frequently Asked Questions section of the Bedloo website. Remember, [employer] is NOT to be used for urgent needs. For medical emergencies, dial 911. Now available from your iPhone and Android! Please provide this summary of care documentation to your next provider. Your primary care clinician is listed as NIMESH CRONIN. If you have any questions after today's visit, please call [phone].

## 2018-05-02 NOTE — PATIENT INSTRUCTIONS

## 2018-05-02 NOTE — PROGRESS NOTES
Chika Loja is a 43 y.o. male   Chief Complaint   Patient presents with    Head Injury     Pt f/u from ER. Pt states right side pain when moving and Headache yesterday. pt fell and hit his head on sunday and was brought to ED on Monday. Pt was being aggressive toward staff and picked up a chair and it was too heavy and he fell back and hit his head on the wall. Pt did not lose consciousness. Had a CT at ED and was normal.  Just here for f/u and is acting his usual self. Pt does have a laceration on crown of his ehad which apepars to be healing fine, no sutures or staples. he is a 43y.o. year old male who presents for evalution. Reviewed PmHx, RxHx, FmHx, SocHx, AllgHx and updated and dated in the chart. Review of Systems - negative except as listed above in the HPI    Objective:     Vitals:    05/02/18 0959   BP: 116/79   Pulse: 82   Resp: 16   Temp: 97.4 °F (36.3 °C)   TempSrc: Oral   SpO2: 99%   Weight: 141 lb 6.4 oz (64.1 kg)   Height: 5' 6\" (1.676 m)       Current Outpatient Prescriptions   Medication Sig    divalproex DR (DEPAKOTE) 125 mg tablet TAKE ONE TABLET BY MOUTH AT BEDTIME.  divalproex DR (DEPAKOTE) 250 mg tablet TAKE 1 TABLET BY MOUTH TWICE DAILY.  lamoTRIgine (LAMICTAL) 100 mg tablet TAKE 1 TABLET BY MOUTH TWICE DAILY.  docusate sodium (COLACE) 100 mg capsule Take 100 mg by mouth two (2) times a day.  acetaminophen (TYLENOL) 500 mg tablet Take 1 Tab by mouth every six (6) hours as needed for Pain. Indications: HEADACHE DISORDER, Pain    cloBAZam (ONFI) 10 mg tab tablet Take 0.5 Tabs by mouth daily. Max Daily Amount: 5 mg.  cloBAZam (ONFI) 10 mg tab tablet Take 1 Tab by mouth nightly. Max Daily Amount: 10 mg.    lacosamide (VIMPAT) 200 mg tab tablet Take 1 Tab by mouth two (2) times a day. Max Daily Amount: 400 mg.  busPIRone (BUSPAR) 10 mg tablet Take 20 mg by mouth two (2) times a day.  risperiDONE (RISPERDAL) 1 mg tablet Take 1 mg by mouth nightly.  benzoyl peroxide-erythromycin (BENZAMYCIN) 3-5 % topical gel Apply  to affected area two (2) times daily as needed (acne).   mg capsule TAKE ONE CAPSULE BY MOUTH DAILY    meclizine (ANTIVERT) 25 mg chewable tablet TAKE (1) TABLET BY MOUTH THREE TIMES DAILY AS NEEDED FOR NAUSEA    LORazepam (ATIVAN) 1 mg tablet Take 1 mg by mouth every six (6) hours as needed for Anxiety. No current facility-administered medications for this visit. Physical Examination: General appearance - alert, well appearing, and in no distress  Mental status - pt behaving at baseline  Chest - clear to auscultation, no wheezes, rales or rhonchi, symmetric air entry  Heart - normal rate, regular rhythm, normal S1, S2, no murmurs, rubs, clicks or gallops  Skin - healing 3cm laceration top of head. Assessment/ Plan:   Diagnoses and all orders for this visit:    1. Laceration of scalp without foreign body, initial encounter     keep clean and dry. Pt has f/u with psych next week for behaviors  Follow-up Disposition:  Return if symptoms worsen or fail to improve. I have discussed the diagnosis with the patient and the intended plan as seen in the above orders. The patient has received an after-visit summary and questions were answered concerning future plans. Pt conveyed understanding of plan.     Medication Side Effects and Warnings were discussed with patient      Andrey Romano DO

## 2018-05-17 DIAGNOSIS — L70.9 ACNE, UNSPECIFIED ACNE TYPE: ICD-10-CM

## 2018-05-21 RX ORDER — ERYTHROMYCIN AND BENZOYL PEROXIDE 30; 50 MG/G; MG/G
GEL TOPICAL
Qty: 46.6 G | Refills: 0 | Status: SHIPPED | OUTPATIENT
Start: 2018-05-21 | End: 2020-02-07

## 2018-05-30 ENCOUNTER — TELEPHONE (OUTPATIENT)
Dept: FAMILY MEDICINE CLINIC | Age: 43
End: 2018-05-30

## 2018-06-01 NOTE — TELEPHONE ENCOUNTER
I did not order this and was originally ordered >1 year prior by Dr An Wheeler, does pt still need an acne topical?

## 2018-06-05 DIAGNOSIS — K59.00 CONSTIPATION, UNSPECIFIED CONSTIPATION TYPE: ICD-10-CM

## 2018-06-06 RX ORDER — DOCUSATE SODIUM 100 MG/1
CAPSULE, LIQUID FILLED ORAL
Qty: 30 CAP | Refills: 0 | Status: SHIPPED | OUTPATIENT
Start: 2018-06-06 | End: 2018-09-24 | Stop reason: SDUPTHER

## 2018-07-11 ENCOUNTER — OFFICE VISIT (OUTPATIENT)
Dept: NEUROLOGY | Age: 43
End: 2018-07-11

## 2018-07-11 VITALS
HEIGHT: 66 IN | BODY MASS INDEX: 22.66 KG/M2 | WEIGHT: 141 LBS | SYSTOLIC BLOOD PRESSURE: 102 MMHG | DIASTOLIC BLOOD PRESSURE: 74 MMHG

## 2018-07-11 DIAGNOSIS — G40.009 LOCALIZATION-RELATED (FOCAL) (PARTIAL) IDIOPATHIC EPILEPSY AND EPILEPTIC SYNDROMES WITH SEIZURES OF LOCALIZED ONSET, NOT INTRACTABLE, WITHOUT STATUS EPILEPTICUS (HCC): Primary | ICD-10-CM

## 2018-07-11 RX ORDER — RISPERIDONE 0.5 MG/1
TABLET, FILM COATED ORAL
COMMUNITY
End: 2019-06-20 | Stop reason: ALTCHOICE

## 2018-07-11 RX ORDER — HYDROXYZINE PAMOATE 25 MG/1
25 CAPSULE ORAL
COMMUNITY
End: 2021-05-03

## 2018-07-11 NOTE — PROGRESS NOTES
Patient is here for follow up with caregiver, Daniel Ribera. Patient lives in a group home [facility] the limit residential. No seizures since last visit. No concerns today.

## 2018-07-11 NOTE — MR AVS SNAPSHOT
[address] [place] [address] 
[phone] Patient: Tobias Fuentes MRN: [id number] :1975 Visit Information Date & Time Provider Department Dept. Phone Encounter #  
 2018  3:00 PM Tyrone Souza NP [facility] [phone] [id number] Upcoming Health Maintenance Date Due Influenza Age 5 to Adult 2018 MEDICARE YEARLY EXAM 2019 DTaP/Tdap/Td series (2 - Td) 2028 Allergies as of 2018  Review Complete On: 2018 By: Alexia Barraza No Known Allergies Current Immunizations  Never Reviewed Name Date  
 TB Skin Test (PPD) Intradermal 2018, 2017 Not reviewed this visit You Were Diagnosed With   
  
 Codes Comments Localization-related (focal) (partial) idiopathic epilepsy and epileptic syndromes with seizures of localized onset, not intractable, without status epilepticus ([facility]    -  Primary ICD-10-CM: G40.009 ICD-9-CM: 345.50 Vitals BP Height(growth percentile) Weight(growth percentile) BMI Smoking Status 102/74 5' 6\" (1.676 m) 141 lb (64 kg) 22.76 kg/m2 Never Smoker BMI and BSA Data Body Mass Index Body Surface Area  
 22.76 kg/m 2 1.73 m 2 Preferred Pharmacy Pharmacy Name Phone [facility]  [phone] Your Updated Medication List  
  
   
This list is accurate as of 18  3:51 PM.  Always use your most recent med list.  
  
  
  
  
 acetaminophen 500 mg tablet Commonly known as:  TYLENOL Take 1 Tab by mouth every six (6) hours as needed for Pain. Indications: HEADACHE DISORDER, Pain  
  
 benzoyl peroxide-erythromycin 3-5 % topical gel Commonly known as:  BENZAMYCIN  
APPLY TO AFFECTED AREA TWICE A DAY AS NEEDED FOR ACNE  
  
 busPIRone 10 mg tablet Commonly known as:  BUSPAR  
 Take 20 mg by mouth two (2) times a day. * cloBAZam 10 mg Tab tablet Commonly known as:  ONFI Take 0.5 Tabs by mouth daily. Max Daily Amount: 5 mg. * cloBAZam 10 mg Tab tablet Commonly known as:  ONFI Take 1 Tab by mouth nightly. Max Daily Amount: 10 mg.  
  
 * COLACE 100 mg capsule Generic drug:  docusate sodium Take 100 mg by mouth two (2) times a day. *  mg capsule Generic drug:  docusate sodium TAKE 1 CAPSULE BY MOUTH DAILY * divalproex  mg tablet Commonly known as:  DEPAKOTE  
TAKE ONE TABLET BY MOUTH AT BEDTIME. * divalproex  mg tablet Commonly known as:  DEPAKOTE  
TAKE 1 TABLET BY MOUTH TWICE DAILY. hydrOXYzine pamoate 25 mg capsule Commonly known as:  VISTARIL Take 25 mg by mouth two (2) times a day. lacosamide 200 mg Tab tablet Commonly known as:  VIMPAT Take 1 Tab by mouth two (2) times a day. Max Daily Amount: 400 mg.  
  
 lamoTRIgine 100 mg tablet Commonly known as: LaMICtal  
TAKE 1 TABLET BY MOUTH TWICE DAILY. LORazepam 1 mg tablet Commonly known as:  ATIVAN Take 1 mg by mouth every six (6) hours as needed for Anxiety. meclizine 25 mg chewable tablet Commonly known as:  ANTIVERT  
TAKE (1) TABLET BY MOUTH THREE TIMES DAILY AS NEEDED FOR NAUSEA * RisperDAL 1 mg tablet Generic drug:  risperiDONE Take 1 mg by mouth nightly. * risperiDONE 0.5 mg tablet Commonly known as:  RisperDAL Take  by mouth. * Notice: This list has 8 medication(s) that are the same as other medications prescribed for you. Read the directions carefully, and ask your doctor or other care provider to review them with you. Patient Instructions PRESCRIPTION REFILL POLICY [facility] Statement to Patients April 1, 2014 In an effort to ensure the large volume of patient prescription refills is processed in the most efficient and expeditious manner, we are asking our patients to assist us by calling your Pharmacy for all prescription refills, this will include also your  Mail Order Pharmacy. The pharmacy will contact our office electronically to continue the refill process. Please do not wait until the last minute to call your pharmacy. We need at least 48 hours (2days) to fill prescriptions. We also encourage you to call your pharmacy before going to  your prescription to make sure it is ready. With regard to controlled substance prescription refill requests (narcotic refills) that need to be picked up at our office, we ask your cooperation by providing us with at least 72 hours (3days) notice that you will need a refill. We will not refill narcotic prescription refill requests after 4:00pm on any weekday, Monday through Thursday, or after 2:00pm on Fridays, or on the weekends. We encourage everyone to explore another way of getting your prescription refill request processed using [employer], our patient web portal through our electronic medical record system. [employer] is an efficient and effective way to communicate your medication request directly to the office and  downloadable as an guadalupe on your smart phone . [employer] also features a review functionality that allows you to view your medication list as well as leave messages for your physician. Are you ready to get connected? If so please review the attatched instructions or speak to any of our staff to get you set up right away! Thank you so much for your cooperation. Should you have any questions please contact our Practice Administrator. The Physicians and Staff,  [facility] Introducing [facility] & HEALTH SERVICES! [employer] introduces [employer] patient portal. Now you can access parts of your medical record, email your doctor's office, and request medication refills online.    
 
1. In your internet browser, go to [email or web address] 2. Click on the First Time User? Click Here link in the Sign In box. You will see the New Member Sign Up page. 3. Enter your An[employer] Access Code exactly as it appears below. You will not need to use this code after youve completed the sign-up process. If you do not sign up before the expiration date, you must request a new code. · [employer] Access Code: [email or web address] Expires: 10/9/2018  3:51 PM 
 
4. Enter the last four digits of your Social Security Number (xxxx) and Date of Birth (mm/dd/yyyy) as indicated and click Submit. You will be taken to the next sign-up page. 5. Create a 2CODE Onlinet ID. This will be your An[employer] login ID and cannot be changed, so think of one that is secure and easy to remember. 6. Create a An[employer] password. You can change your password at any time. 7. Enter your Password Reset Question and Answer. This can be used at a later time if you forget your password. 8. Enter your e-mail address. You will receive e-mail notification when new information is available in [address] 9. Click Sign Up. You can now view and download portions of your medical record. 10. Click the Download Summary menu link to download a portable copy of your medical information. If you have questions, please visit the Frequently Asked Questions section of the An[employer] website. Remember, An[employer] is NOT to be used for urgent needs. For medical emergencies, dial 911. Now available from your iPhone and Android! Please provide this summary of care documentation to your next provider. Your primary care clinician is listed as NIMESH CRONIN. If you have any questions after today's visit, please call [phone].

## 2018-07-11 NOTE — PATIENT INSTRUCTIONS
[address] [facility]   Statement to Patients  April 1, 2014      In an effort to ensure the large volume of patient prescription refills is processed in the most efficient and expeditious manner, we are asking our patients to assist us by calling your Pharmacy for all prescription refills, this will include also your  Mail Order Pharmacy. The pharmacy will contact our office electronically to continue the refill process. Please do not wait until the last minute to call your pharmacy. We need at least 48 hours (2days) to fill prescriptions. We also encourage you to call your pharmacy before going to  your prescription to make sure it is ready. With regard to controlled substance prescription refill requests (narcotic refills) that need to be picked up at our office, we ask your cooperation by providing us with at least 72 hours (3days) notice that you will need a refill. We will not refill narcotic prescription refill requests after 4:00pm on any weekday, Monday through Thursday, or after 2:00pm on Fridays, or on the weekends. We encourage everyone to explore another way of getting your prescription refill request processed using Unirisx, our patient web portal through our electronic medical record system. Unirisx is an efficient and effective way to communicate your medication request directly to the office and  downloadable as an guadalupe on your smart phone . [employer] also features a review functionality that allows you to view your medication list as well as leave messages for your physician. Are you ready to get connected? If so please review the attatched instructions or speak to any of our staff to get you set up right away! Thank you so much for your cooperation. Should you have any questions please contact our Practice Administrator.     The Physicians and Staff,  [facility]

## 2018-07-12 NOTE — PROGRESS NOTES
Date:  18 Name:  Dee Wick 
:  1975 MRN:  [id number] PCP:  Mitchell Woody MD 
 
Chief Complaint Patient presents with  Follow-up  
  seizures HISTORY OF PRESENT ILLNESS:Follow up visit for seizures. The patient is accompanied by his Caregiver Eda who is the next room. Patient  reports that he has not has any seizure-like activity. He is currently taking Vimpat,onfi, Lamictal. PCP completed yearly lab work which was normal. There is no concern at today visit. Seizure protocol paperwork to be completed Except as noted above, denies  fever, chills, cough. No CP or SOB. No dysuria, loss of bowel or bladder control. No Weight loss. Appetite good. Sleeping well. No sweats. No edema. No bruising or bleeding. No nausea or vomit. No diarrhea. No frequency, urgency, No depressive sxs. No anxiety. Denies sore throat, nasal congestion, nasal discharge, epistaxis, tinnitus, hearing loss, back pain, muscle pain, or joint pain. Current Outpatient Prescriptions Medication Sig  
 hydrOXYzine pamoate (VISTARIL) 25 mg capsule Take 25 mg by mouth two (2) times a day.  risperiDONE (RISPERDAL) 0.5 mg tablet Take  by mouth.  benzoyl peroxide-erythromycin (BENZAMYCIN) 3-5 % topical gel APPLY TO AFFECTED AREA TWICE A DAY AS NEEDED FOR ACNE  divalproex DR (DEPAKOTE) 125 mg tablet TAKE ONE TABLET BY MOUTH AT BEDTIME.  divalproex DR (DEPAKOTE) 250 mg tablet TAKE 1 TABLET BY MOUTH TWICE DAILY.  lamoTRIgine (LAMICTAL) 100 mg tablet TAKE 1 TABLET BY MOUTH TWICE DAILY.  docusate sodium (COLACE) 100 mg capsule Take 100 mg by mouth two (2) times a day.  cloBAZam (ONFI) 10 mg tab tablet Take 0.5 Tabs by mouth daily. Max Daily Amount: 5 mg.  cloBAZam (ONFI) 10 mg tab tablet Take 1 Tab by mouth nightly. Max Daily Amount: 10 mg.  
 lacosamide (VIMPAT) 200 mg tab tablet Take 1 Tab by mouth two (2) times a day. Max Daily Amount: 400 mg.   
 busPIRone (BUSPAR) 10 mg tablet Take 20 mg by mouth two (2) times a day.  risperiDONE (RISPERDAL) 1 mg tablet Take 1 mg by mouth nightly.   mg capsule TAKE 1 CAPSULE BY MOUTH DAILY  meclizine (ANTIVERT) 25 mg chewable tablet TAKE (1) TABLET BY MOUTH THREE TIMES DAILY AS NEEDED FOR NAUSEA  acetaminophen (TYLENOL) 500 mg tablet Take 1 Tab by mouth every six (6) hours as needed for Pain. Indications: HEADACHE DISORDER, Pain  LORazepam (ATIVAN) 1 mg tablet Take 1 mg by mouth every six (6) hours as needed for Anxiety. No current facility-administered medications for this visit. No Known Allergies Past Medical History:  
Diagnosis Date  Blindness  Cerebral palsy ([facility]  Intellectual disability  [facility] undifferentiated [facility]  Seizure disorder ([facility] History reviewed. No pertinent surgical history. Social History Social History  Marital status: SINGLE Spouse name: N/A  
 Number of children: N/A  
 Years of education: N/A Occupational History  Not on file. Social History Main Topics  Smoking status: Never Smoker  Smokeless tobacco: Never Used  Alcohol use No  
 Drug use: No  
 Sexual activity: Not Currently Other Topics Concern  Not on file Social History Narrative Family History Problem Relation Age of Onset  Family history unknown: Yes PHYSICAL EXAMINATION:   
Visit Vitals  /74  Ht 5' 6\" (1.676 m)  Wt 141 lb (64 kg)  BMI 22.76 kg/m2 General: Well defined, nourished, and groomed individual in no acute distress. Neck: Supple, nontender, no bruits, no pain with resistance to active range of motion. Heart: Regular rate and rhythm, no murmurs, rub, or gallop. Normal S1S2. Lungs: Clear to auscultation bilaterally with equal chest expansion, no cough, no wheeze Musculoskeletal: Extremities revealed no edema and had full range of motion of joints.   
Psych: Good mood and bright affect 
   
NEUROLOGICAL EXAMINATION:  
Mental Status: Alert and oriented to person, place, and time  
   
Cranial Nerves:  
II, III, IV, VI: Visual acuity grossly intact. Visual fields are normal.  
Pupils are equal, round, and reactive to light and accommodation. Extra-ocular movements are full and fluid. Fundoscopic exam was benign, no ptosis or nystagmus. V-XII: Hearing is grossly intact. Facial features are symmetric, with normal sensation and strength. The palate rises symmetrically and the tongue protrudes midline. Sternocleidomastoids 5/5.  
   
Motor Examination: Normal tone, bulk, and strength, 5/5 muscle strength throughout.  
   
Coordination: No resting or intention tremor 
   
Gait and Station: Steady while walking. Normal arm swing. No pronator drift. No muscle wasting or fasiculations noted.  
   
Reflexes: DTRs 2+ throughout. ASSESSMENT AND PLAN 
  ICD-10-CM ICD-9-CM 1. Localization-related (focal) (partial) idiopathic epilepsy and epileptic syndromes with seizures of localized onset, not intractable, without status epilepticus ([facility] G40.009 345.50 Seizure,stable. Continue treatment as prescribed. Completed Seizure protocol paperwork Follow up visit 1 year This note will not be viewable in [address]  
Glenda Welsh NP

## 2018-08-07 RX ORDER — DOCUSATE SODIUM 100 MG
CAPSULE ORAL
Qty: 31 CAP | Refills: 0 | Status: SHIPPED | OUTPATIENT
Start: 2018-08-07 | End: 2019-06-20 | Stop reason: ALTCHOICE

## 2018-09-10 ENCOUNTER — OFFICE VISIT (OUTPATIENT)
Dept: FAMILY MEDICINE CLINIC | Age: 43
End: 2018-09-10

## 2018-09-10 VITALS
DIASTOLIC BLOOD PRESSURE: 75 MMHG | BODY MASS INDEX: 23.14 KG/M2 | OXYGEN SATURATION: 98 % | HEIGHT: 66 IN | WEIGHT: 144 LBS | SYSTOLIC BLOOD PRESSURE: 116 MMHG | RESPIRATION RATE: 16 BRPM | TEMPERATURE: 97.4 F | HEART RATE: 66 BPM

## 2018-09-10 DIAGNOSIS — J30.9 ALLERGIC RHINITIS, UNSPECIFIED SEASONALITY, UNSPECIFIED TRIGGER: Primary | ICD-10-CM

## 2018-09-10 RX ORDER — CETIRIZINE HCL 10 MG
10 TABLET ORAL DAILY
Qty: 30 TAB | Refills: 5 | Status: SHIPPED | OUTPATIENT
Start: 2018-09-10 | End: 2019-01-17 | Stop reason: SDUPTHER

## 2018-09-10 RX ORDER — GUAIFENESIN 600 MG/1
600 TABLET, EXTENDED RELEASE ORAL 2 TIMES DAILY
Qty: 60 TAB | Refills: 1 | Status: SHIPPED | OUTPATIENT
Start: 2018-09-10 | End: 2021-05-03

## 2018-09-10 RX ORDER — ACETAMINOPHEN 500 MG
500 TABLET ORAL
Qty: 60 TAB | Refills: 1 | Status: SHIPPED | OUTPATIENT
Start: 2018-09-10 | End: 2021-05-03

## 2018-09-10 NOTE — PROGRESS NOTES
Madai Loja is a 43 y.o. male    Chief Complaint   Patient presents with    Cough     for 2 days, sneezing and yellow mucous while coughing, runny nose       1. Have you been to the ER, urgent care clinic since your last visit? Hospitalized since your last visit? No  M  2. Have you seen or consulted any other health care providers outside of the [address] [place] [address] since your last visit? Include any pap smears or colon screening. No      Visit Vitals    /75 (BP 1 Location: Left arm, BP Patient Position: Sitting)    Pulse 66    Temp 97.4 °F (36.3 °C) (Oral)    Resp 16    Ht 5' 6\" (1.676 m)    Wt 144 lb (65.3 kg)    SpO2 98%    BMI 23.24 kg/m2           Health Maintenance Due   Topic Date Due    Influenza Age 5 to Adult  08/01/2018         Medication Reconciliation completed, changes noted.   Please

## 2018-09-10 NOTE — MR AVS SNAPSHOT
[address] [address] 
[phone] Patient: Shannan Lorenzo MRN: [id number] :1975 Visit Information Date & Time Provider Department Dept. Phone Encounter #  
 9/10/2018  9:00 AM Amy Khan NP [address] [facility] [phone] [id number] Upcoming Health Maintenance Date Due Influenza Age 5 to Adult 2018 MEDICARE YEARLY EXAM 2019 DTaP/Tdap/Td series (2 - Td) 2028 Allergies as of 9/10/2018  Review Complete On: 9/10/2018 By: Amy Khan NP No Known Allergies Current Immunizations  Never Reviewed Name Date  
 TB Skin Test (PPD) Intradermal 2018, 2017 Not reviewed this visit You Were Diagnosed With   
  
 Codes Comments Allergic rhinitis, unspecified seasonality, unspecified trigger    -  Primary ICD-10-CM: J30.9 ICD-9-CM: 477.9 Vitals BP Pulse Temp Resp Height(growth percentile) Weight(growth percentile) 116/75 (BP 1 Location: Left arm, BP Patient Position: Sitting) 66 97.4 °F (36.3 °C) (Oral) 16 5' 6\" (1.676 m) 144 lb (65.3 kg) SpO2 BMI Smoking Status 98% 23.24 kg/m2 Never Smoker Vitals History BMI and BSA Data Body Mass Index Body Surface Area  
 23.24 kg/m 2 1.74 m 2 Preferred Pharmacy Pharmacy Name Phone [address] [place] [address] [phone] Your Updated Medication List  
  
   
This list is accurate as of 9/10/18 10:27 AM.  Always use your most recent med list.  
  
  
  
  
 acetaminophen 500 mg tablet Commonly known as:  TYLENOL Take 1 Tab by mouth every six (6) hours as needed (as needed for pain or fever). Indications: Headache Disorder, Pain  
  
 benzoyl peroxide-erythromycin 3-5 % topical gel Commonly known as:  BENZAMYCIN  
APPLY TO AFFECTED AREA TWICE A DAY AS NEEDED FOR ACNE  
  
 busPIRone 10 mg tablet Commonly known as:  BUSPAR Take 20 mg by mouth two (2) times a day. cetirizine 10 mg tablet Commonly known as:  ZYRTEC Take 1 Tab by mouth daily. * cloBAZam 10 mg Tab tablet Commonly known as:  ONFI Take 0.5 Tabs by mouth daily. Max Daily Amount: 5 mg. * cloBAZam 10 mg Tab tablet Commonly known as:  ONFI Take 1 Tab by mouth nightly. Max Daily Amount: 10 mg.  
  
 * COLACE 100 mg capsule Generic drug:  docusate sodium Take 100 mg by mouth two (2) times a day. *  mg capsule Generic drug:  docusate sodium TAKE 1 CAPSULE BY MOUTH DAILY  
  
 * STOOL SOFTENER 100 mg capsule Generic drug:  docusate sodium TAKE 1 CAPSULE BY MOUTH ONCE DAILY * divalproex  mg tablet Commonly known as:  DEPAKOTE  
TAKE ONE TABLET BY MOUTH AT BEDTIME. * divalproex  mg tablet Commonly known as:  DEPAKOTE  
TAKE 1 TABLET BY MOUTH TWICE DAILY. guaiFENesin  mg ER tablet Commonly known as:  Raad & Raad Take 1 Tab by mouth two (2) times a day. As needed for cough or congestion  
  
 hydrOXYzine pamoate 25 mg capsule Commonly known as:  VISTARIL Take 25 mg by mouth two (2) times a day. lacosamide 200 mg Tab tablet Commonly known as:  VIMPAT Take 1 Tab by mouth two (2) times a day. Max Daily Amount: 400 mg.  
  
 lamoTRIgine 100 mg tablet Commonly known as: LaMICtal  
TAKE 1 TABLET BY MOUTH TWICE DAILY. LORazepam 1 mg tablet Commonly known as:  ATIVAN Take 1 mg by mouth every six (6) hours as needed for Anxiety. meclizine 25 mg chewable tablet Commonly known as:  ANTIVERT  
TAKE (1) TABLET BY MOUTH THREE TIMES DAILY AS NEEDED FOR NAUSEA * RisperDAL 1 mg tablet Generic drug:  risperiDONE Take 1 mg by mouth nightly. * risperiDONE 0.5 mg tablet Commonly known as:  RisperDAL Take  by mouth. * Notice:   This list has 9 medication(s) that are the same as other medications prescribed for you. Read the directions carefully, and ask your doctor or other care provider to review them with you. Prescriptions Sent to Pharmacy Refills  
 acetaminophen (TYLENOL) 500 mg tablet 1 Sig: Take 1 Tab by mouth every six (6) hours as needed (as needed for pain or fever). Indications: Headache Disorder, Pain Class: Normal  
 Pharmacy: [address] [place] [address] Ph #: [phone] Route: Oral  
 guaiFENesin ER (MUCINEX) 600 mg ER tablet 1 Sig: Take 1 Tab by mouth two (2) times a day. As needed for cough or congestion Class: Normal  
 Pharmacy: [address] [place] [address] Ph #: [phone] Route: Oral  
 cetirizine (ZYRTEC) 10 mg tablet 5 Sig: Take 1 Tab by mouth daily. Class: Normal  
 Pharmacy: [address] [place] [address] Ph #: [phone] Route: Oral  
  
Introducing [facility] SERVICES! [employer] introduces Diabetica patient portal. Now you can access parts of your medical record, email your doctor's office, and request medication refills online. 1. In your internet browser, go to [email or web address] 2. Click on the First Time User? Click Here link in the Sign In box. You will see the New Member Sign Up page. 3. Enter your Diabetica Access Code exactly as it appears below. You will not need to use this code after youve completed the sign-up process. If you do not sign up before the expiration date, you must request a new code. · Diabetica Access Code: [email or web address] Expires: 10/9/2018  3:51 PM 
 
4. Enter the last four digits of your Social Security Number (xxxx) and Date of Birth (mm/dd/yyyy) as indicated and click Submit. You will be taken to the next sign-up page. 5. Create a Diabetica ID. This will be your Diabetica login ID and cannot be changed, so think of one that is secure and easy to remember. 6. Create a Scoreloop password. You can change your password at any time. 7. Enter your Password Reset Question and Answer. This can be used at a later time if you forget your password. 8. Enter your e-mail address. You will receive e-mail notification when new information is available in [address] 9. Click Sign Up. You can now view and download portions of your medical record. 10. Click the Download Summary menu link to download a portable copy of your medical information. If you have questions, please visit the Frequently Asked Questions section of the Scoreloop website. Remember, Scoreloop is NOT to be used for urgent needs. For medical emergencies, dial 911. Now available from your iPhone and Android! Please provide this summary of care documentation to your next provider. Your primary care clinician is listed as NIMESH CRONIN. If you have any questions after today's visit, please call [phone].

## 2018-09-10 NOTE — PROGRESS NOTES
Subjective:      Patient : This patient is a 42 yo male that presents with a chief complaint of cough:  non productive, sore throat : which increases with swallowing  and ear ache:  bilaterals. The symptoms have been present for 2 days. They have worsened. The group home states he does not have any orders for prn cold meds or allergy meds for the season. Objective:     Visit Vitals    /75 (BP 1 Location: Left arm, BP Patient Position: Sitting)    Pulse 66    Temp 97.4 °F (36.3 °C) (Oral)    Resp 16    Ht 5' 6\" (1.676 m)    Wt 144 lb (65.3 kg)    SpO2 98%    BMI 23.24 kg/m2       HEENT:  pharyngeal erythema  Heart regular rhythm  Lungs: clear to auscultation and percussion throughout both lung fields  CV:normal  Abdomen  normal  Extremeties:no clubbing, cyanosis, edema  Neuro alert, oriented x 3      Assessment:     URI  Allergic rhinitis    Plan:     The patient seems to have a viral process. Will institute symptomatic therapy. Suggested Mucinex 600 q12 OTC and Zyrtec 10mg daily for 5-7 days. Follow up in office 7 days if persist.  I have discussed the diagnosis with the patient and the intended plan as seen in the above orders. The patient has received an after-visit summary and questions were answered concerning future plans. Patient conveyed understanding of the plan at the time of the visit.     Quyen David, MSN, ANP  9/10/2018

## 2018-09-24 DIAGNOSIS — K59.00 CONSTIPATION, UNSPECIFIED CONSTIPATION TYPE: ICD-10-CM

## 2018-09-24 RX ORDER — DOCUSATE SODIUM 100 MG/1
CAPSULE, LIQUID FILLED ORAL
Qty: 30 CAP | Refills: 0 | Status: SHIPPED | OUTPATIENT
Start: 2018-09-24 | End: 2018-12-11 | Stop reason: SDUPTHER

## 2018-11-27 ENCOUNTER — TELEPHONE (OUTPATIENT)
Dept: FAMILY MEDICINE CLINIC | Age: 43
End: 2018-11-27

## 2018-11-27 NOTE — TELEPHONE ENCOUNTER
Benoyl peroxide- erythromycin submitted to + [facility] via cover my meds. Awaiting reponse.    Smith: Qasim Lawton

## 2019-01-17 RX ORDER — CETIRIZINE HCL 10 MG
10 TABLET ORAL DAILY
Qty: 30 TAB | Refills: 5 | Status: SHIPPED | OUTPATIENT
Start: 2019-01-17 | End: 2021-01-20 | Stop reason: SDUPTHER

## 2019-03-13 ENCOUNTER — OFFICE VISIT (OUTPATIENT)
Dept: FAMILY MEDICINE CLINIC | Age: 44
End: 2019-03-13

## 2019-03-13 VITALS
BODY MASS INDEX: 25.11 KG/M2 | DIASTOLIC BLOOD PRESSURE: 87 MMHG | HEIGHT: 66 IN | WEIGHT: 156.25 LBS | TEMPERATURE: 97.7 F | RESPIRATION RATE: 20 BRPM | OXYGEN SATURATION: 99 % | HEART RATE: 88 BPM | SYSTOLIC BLOOD PRESSURE: 137 MMHG

## 2019-03-13 DIAGNOSIS — J40 BRONCHITIS: Primary | ICD-10-CM

## 2019-03-13 RX ORDER — PSEUDOEPHEDRINE HCL 30 MG
30 TABLET ORAL
Qty: 20 TAB | Refills: 0 | Status: SHIPPED | OUTPATIENT
Start: 2019-03-13 | End: 2021-05-03

## 2019-03-13 RX ORDER — AZITHROMYCIN 250 MG/1
TABLET, FILM COATED ORAL
Qty: 6 TAB | Refills: 0 | Status: SHIPPED | OUTPATIENT
Start: 2019-03-13 | End: 2019-06-20 | Stop reason: ALTCHOICE

## 2019-03-13 RX ORDER — BENZONATATE 200 MG/1
200 CAPSULE ORAL
Qty: 21 CAP | Refills: 0 | Status: SHIPPED | OUTPATIENT
Start: 2019-03-13 | End: 2019-03-20

## 2019-03-13 RX ORDER — BENZONATATE 200 MG/1
200 CAPSULE ORAL
COMMUNITY
End: 2019-03-13 | Stop reason: SDUPTHER

## 2019-03-13 NOTE — PROGRESS NOTES
Chief Complaint   Patient presents with    Cold Symptoms     cold symptoms - Seen in [facility] on Sunday- flu& strep negative- Cough remains     he is a 37y.o. year old male who presents for evalution. Started coughing on Saturday and was in bed all day, low grade fevers. Next day still feeling poorly, went to [facility].  Had chest x-ray, flu and strep test - all normal.  Given Tessalon perles but not helping. Has continued to have coughing and congestion - cough has been drying out a little bit but still present. Still having a lot of congestion from nose. Has run out of Tessalon which helped slightly. Reviewed PmHx, RxHx, FmHx, SocHx, AllgHx and updated and dated in the chart. Review of Systems - negative except as listed above in the HPI    Objective:     Vitals:    03/13/19 1503   BP: 137/87   Pulse: 88   Resp: 20   Temp: 97.7 °F (36.5 °C)   TempSrc: Oral   SpO2: 99%   Weight: 156 lb 4 oz (70.9 kg)   Height: 5' 6\" (1.676 m)     Physical Examination: General appearance - alert, well appearing, and in no distress  Ears - bilateral TM's and external ear canals normal  Nose - normal nontender sinuses, mucosal congestion and mucosal erythema  Mouth - mucous membranes moist, pharynx normal without lesions and erythematous  Neck - supple, no significant adenopathy  Chest - clear to auscultation, no wheezes, rales or rhonchi, symmetric air entry, coarse breath sounds   Heart - normal rate, regular rhythm, normal S1, S2, no murmurs, rubs, clicks or gallops    Assessment/ Plan:   Diagnoses and all orders for this visit:    1. Bronchitis  -     methylPREDNISolone, PF, (SOLU-MEDROL) 125 mg/2 mL solr; 2 mL by IntraVENous route once for 1 dose. -     METHYLPREDNISOLONE INJECTION  -     MI THER/PROPH/DIAG INJECTION, SUBCUT/IM  -     azithromycin (ZITHROMAX) 250 mg tablet; Take 2 tabs po today then 1 tab po x 4 days  -     pseudoephedrine (SUDAFED) 30 mg tablet;  Take 1 Tab by mouth every six (6) hours as needed for Congestion.  -     benzonatate (TESSALON) 200 mg capsule; Take 1 Cap by mouth three (3) times daily as needed for Cough for up to 7 days. New rx. Discussed and encouraged rest and clear fluids, if congestion is thick should avoid dairy. Recommended hot showers with steam and elevating head of bed. May use Vitamin C or Echinacea in addition to current therapy. Pt voiced understanding regarding plan of care. Follow-up Disposition:  Return if symptoms worsen or fail to improve. I have discussed the diagnosis with the patient and the intended plan as seen in the above orders. The patient has received an after-visit summary and questions were answered concerning future plans.      Medication Side Effects and Warnings were discussed with patient    Anton John NP

## 2019-03-13 NOTE — PROGRESS NOTES
1. Have you been to the ER, urgent care clinic since your last visit? Hospitalized since your last visit? Yes- [facility] on Sunday- URI     2. Have you seen or consulted any other health care providers outside of the [address] [place] [address] since your last visit? Include any pap smears or colon screening.  No   Chief Complaint   Patient presents with    Cold Symptoms     cold symptoms - Seen in [facility] on Sunday- flu& strep negative- Cough remains    Group home pt

## 2019-03-13 NOTE — PATIENT INSTRUCTIONS
Bronchitis: Care Instructions  Your Care Instructions    Bronchitis is inflammation of the bronchial tubes, which carry air to the lungs. The tubes swell and produce mucus, or phlegm. The mucus and inflamed bronchial tubes make you cough. You may have trouble breathing. Most cases of bronchitis are caused by viruses like those that cause colds. Antibiotics usually do not help and they may be harmful. Bronchitis usually develops rapidly and lasts about 2 to 3 weeks in otherwise healthy people. Follow-up care is a key part of your treatment and safety. Be sure to make and go to all appointments, and call your doctor if you are having problems. It's also a good idea to know your test results and keep a list of the medicines you take. How can you care for yourself at home? · Take all medicines exactly as prescribed. Call your doctor if you think you are having a problem with your medicine. · Get some extra rest.  · Take an over-the-counter pain medicine, such as acetaminophen (Tylenol), ibuprofen (Advil, Motrin), or naproxen (Aleve) to reduce fever and relieve body aches. Read and follow all instructions on the label. · Do not take two or more pain medicines at the same time unless the doctor told you to. Many pain medicines have acetaminophen, which is Tylenol. Too much acetaminophen (Tylenol) can be harmful. · Take an over-the-counter cough medicine that contains dextromethorphan to help quiet a dry, hacking cough so that you can sleep. Avoid cough medicines that have more than one active ingredient. Read and follow all instructions on the label. · Breathe moist air from a humidifier, hot shower, or sink filled with hot water. The heat and moisture will thin mucus so you can cough it out. · Do not smoke. Smoking can make bronchitis worse. If you need help quitting, talk to your doctor about stop-smoking programs and medicines. These can increase your chances of quitting for good.   When should you call for help? Call 911 anytime you think you may need emergency care. For example, call if:    · You have severe trouble breathing.    Call your doctor now or seek immediate medical care if:    · You have new or worse trouble breathing.     · You cough up dark brown or bloody mucus (sputum).     · You have a new or higher fever.     · You have a new rash.    Watch closely for changes in your health, and be sure to contact your doctor if:    · You cough more deeply or more often, especially if you notice more mucus or a change in the color of your mucus.     · You are not getting better as expected. Where can you learn more? Go to http://pauly-shantel.info/. Enter H333 in the search box to learn more about \"Bronchitis: Care Instructions. \"  Current as of: September 5, 2018  Content Version: 11.9  © 7791-6832 [employer]. Care instructions adapted under license by [employer] (which disclaims liability or warranty for this information). If you have questions about a medical condition or this instruction, always ask your healthcare professional. [address] any warranty or liability for your use of this information.

## 2019-05-02 ENCOUNTER — OFFICE VISIT (OUTPATIENT)
Dept: FAMILY MEDICINE CLINIC | Age: 44
End: 2019-05-02

## 2019-05-02 VITALS
BODY MASS INDEX: 25.23 KG/M2 | RESPIRATION RATE: 14 BRPM | WEIGHT: 157 LBS | OXYGEN SATURATION: 97 % | HEART RATE: 84 BPM | DIASTOLIC BLOOD PRESSURE: 76 MMHG | TEMPERATURE: 97.7 F | SYSTOLIC BLOOD PRESSURE: 115 MMHG | HEIGHT: 66 IN

## 2019-05-02 DIAGNOSIS — M19.079 ARTHRITIS OF FOOT: ICD-10-CM

## 2019-05-02 DIAGNOSIS — S99.922A FOOT INJURY, LEFT, INITIAL ENCOUNTER: Primary | ICD-10-CM

## 2019-05-02 RX ORDER — DICLOFENAC SODIUM 10 MG/G
4 GEL TOPICAL
Qty: 1 EACH | Refills: 2 | Status: SHIPPED | OUTPATIENT
Start: 2019-05-02 | End: 2019-06-20 | Stop reason: ALTCHOICE

## 2019-05-02 RX ORDER — DICLOFENAC SODIUM 75 MG/1
75 TABLET, DELAYED RELEASE ORAL
Qty: 30 TAB | Refills: 0 | Status: SHIPPED | OUTPATIENT
Start: 2019-05-02 | End: 2019-06-20 | Stop reason: ALTCHOICE

## 2019-05-02 NOTE — PROGRESS NOTES
Chief Complaint   Patient presents with    Follow-up     he is a 37y.o. year old male who presents for evalution. 4/20 pt walked out of bathroom and into door. Foot turned black and blue so took to [facility].  Was told no fractures but did have arthritis. Pt has been taking prn Tylenol but not really helping. Pt was crying today from pain, radiated all up into knee. Requesting new rx today to help with pain and prn arthritis issues. Pt would like to continue waering foot brace - is very active at work, lots of walking. Reviewed PmHx, RxHx, FmHx, SocHx, AllgHx and updated and dated in the chart. Review of Systems - negative except as listed above in the HPI    Objective:     Vitals:    05/02/19 1119   BP: 115/76   Pulse: 84   Resp: 14   Temp: 97.7 °F (36.5 °C)   TempSrc: Oral   SpO2: 97%   Weight: 157 lb (71.2 kg)   Height: 5' 6\" (1.676 m)     Physical Examination: General appearance - alert, well appearing, and in no distress  Chest - clear to auscultation, no wheezes, rales or rhonchi, symmetric air entry  Heart - normal rate, regular rhythm, normal S1, S2, no murmurs, rubs, clicks or gallops  Musculoskeletal - abnormal exam of left foot  Movements painful, generalized tenderness MTP joints   3rd toe swollen and tender     Assessment/ Plan:   Diagnoses and all orders for this visit:    1. Foot injury, left, initial encounter  -     diclofenac EC (VOLTAREN) 75 mg EC tablet; Take 1 Tab by mouth two (2) times daily (after meals). New rx. Activity modification, application of ice. F/U prn    2. Arthritis of foot  -     diclofenac (VOLTAREN) 1 % gel; Apply 4 g to affected area four (4) times daily as needed for Pain. Arthritis pain  New rx for prn usage. Pt voiced understanding regarding plan of care. I have discussed the diagnosis with the patient and the intended plan as seen in the above orders.   The patient has received an after-visit summary and questions were answered concerning future plans.      Medication Side Effects and Warnings were discussed with patient      Bharat Briceño NP

## 2019-05-02 NOTE — PROGRESS NOTES
Chief Complaint   Patient presents with    Follow-up     Pt in office today for fu from [facility]  -pt states he stubbed his toe  -pt states it sill hurts and sometimes radiates to his knee  -pt's caregiver states that they took an x-ray and there is arthritis in the toes  Pt was given a boot to wear for 2-3 days which helped    Pt has no other concerns

## 2019-05-15 RX ORDER — DOCUSATE SODIUM 100 MG
CAPSULE ORAL
Qty: 31 CAP | Refills: 0 | Status: SHIPPED | OUTPATIENT
Start: 2019-05-15 | End: 2019-06-20 | Stop reason: ALTCHOICE

## 2019-06-20 ENCOUNTER — OFFICE VISIT (OUTPATIENT)
Dept: FAMILY MEDICINE CLINIC | Age: 44
End: 2019-06-20

## 2019-06-20 ENCOUNTER — HOSPITAL ENCOUNTER (OUTPATIENT)
Dept: LAB | Age: 44
Discharge: HOME | End: 2019-06-20
Payer: MEDICARE

## 2019-06-20 VITALS — HEIGHT: 66 IN | WEIGHT: 159 LBS | BODY MASS INDEX: 25.55 KG/M2 | RESPIRATION RATE: 18 BRPM

## 2019-06-20 DIAGNOSIS — Z11.1 SCREENING-PULMONARY TB: ICD-10-CM

## 2019-06-20 DIAGNOSIS — Z00.00 ROUTINE GENERAL MEDICAL EXAMINATION AT A HEALTH CARE FACILITY: Primary | ICD-10-CM

## 2019-06-20 DIAGNOSIS — F71 MODERATE INTELLECTUAL DISABILITY: ICD-10-CM

## 2019-06-20 DIAGNOSIS — K59.00 CONSTIPATION, UNSPECIFIED CONSTIPATION TYPE: ICD-10-CM

## 2019-06-20 DIAGNOSIS — R56.9 SEIZURE (HCC): ICD-10-CM

## 2019-06-20 PROCEDURE — 80164 ASSAY DIPROPYLACETIC ACD TOT: CPT

## 2019-06-20 PROCEDURE — 86480 TB TEST CELL IMMUN MEASURE: CPT

## 2019-06-20 PROCEDURE — 85025 COMPLETE CBC W/AUTO DIFF WBC: CPT

## 2019-06-20 PROCEDURE — 80053 COMPREHEN METABOLIC PANEL: CPT

## 2019-06-20 RX ORDER — DOCUSATE SODIUM 100 MG/1
CAPSULE, LIQUID FILLED ORAL
Qty: 30 CAP | Refills: 5 | Status: SHIPPED | OUTPATIENT
Start: 2019-06-20 | End: 2020-07-18 | Stop reason: SDUPTHER

## 2019-06-20 NOTE — PROGRESS NOTES
Patient here for physical and tb test.    1. Have you been to the ER, urgent care clinic since your last visit? Hospitalized since your last visit? No    2. Have you seen or consulted any other health care providers outside of the [address] [place] [address] since your last visit? Include any pap smears or colon screening. No     Chief Complaint   Patient presents with    Physical     needs tb screen     he is a 37y.o. year old male who presents for evalution. Reviewed PmHx, RxHx, FmHx, SocHx, AllgHx and updated and dated in the chart. Patient Active Problem List    Diagnosis    Constipation    Moderate intellectual disability    Acne    Seizure ([facility] [address]       Review of Systems - negative except as listed above in the HPI    Objective:     Vitals:    06/20/19 1039   Resp: 18   Weight: 159 lb (72.1 kg)   Height: 5' 6\" (1.676 m)     Physical Examination: General appearance - alert, well appearing, and in no distress  Mouth - mucous membranes moist, pharynx normal without lesions  Neck - supple, no significant adenopathy  Chest - clear to auscultation, no wheezes, rales or rhonchi, symmetric air entry  Heart - normal rate, regular rhythm, normal S1, S2, no murmurs, rubs, clicks or gallops  Abdomen - soft, nontender, nondistended, no masses or organomegaly  Extremities - peripheral pulses normal, no pedal edema, no clubbing or cyanosis    Assessment/ Plan:   Diagnoses and all orders for this visit:    1. Routine general medical examination at a health care facility  -stable    2. Seizure ([facility] [address]  -     METABOLIC PANEL, COMPREHENSIVE  -     CBC WITH AUTOMATED DIFF  -     VALPROIC ACID  -no Szs    3.  Moderate intellectual disability  -at [facility]    4. Screening-pulmonary TB  -     QUANTIFERON-TB GOLD PLUS       -Patient is in good health  -Discussed with patient cancer risk factors and screens needed  -Colonoscopy was recommended based on current guidelines for screening.  -Labs from previous visits were discussed with patient yes  -Discussed with patient diet and exercise  -Immunizations appropriate for age were discussed with pt and updated  -    I have discussed the diagnosis with the patient and the intended plan as seen in the above orders. The patient understands and agrees with the plan. The patient has received an after-visit summary and questions were answered concerning future plans. Medication Side Effects and Warnings were discussed with patient  Patient Labs were reviewed and or requested  Patient Past Records were reviewed and or requested     There are no Patient Instructions on file for this visit.         Annmarie Medina M.D.

## 2019-06-25 LAB
ALBUMIN SERPL-MCNC: 4.3 G/DL (ref 3.5–5.5)
ALBUMIN/GLOB SERPL: 1.5 {RATIO} (ref 1.2–2.2)
ALP SERPL-CCNC: 81 IU/L (ref 39–117)
ALT SERPL-CCNC: 16 IU/L (ref 0–44)
AST SERPL-CCNC: 19 IU/L (ref 0–40)
BASOPHILS # BLD AUTO: 0 X10E3/UL (ref 0–0.2)
BASOPHILS NFR BLD AUTO: 0 %
BILIRUB SERPL-MCNC: 0.3 MG/DL (ref 0–1.2)
BUN SERPL-MCNC: 11 MG/DL (ref 6–24)
BUN/CREAT SERPL: 16 (ref 9–20)
CALCIUM SERPL-MCNC: 9.5 MG/DL (ref 8.7–10.2)
CHLORIDE SERPL-SCNC: 105 MMOL/L (ref 96–106)
CO2 SERPL-SCNC: 22 MMOL/L (ref 20–29)
CREAT SERPL-MCNC: 0.68 MG/DL (ref 0.76–1.27)
EOSINOPHIL # BLD AUTO: 0 X10E3/UL (ref 0–0.4)
EOSINOPHIL NFR BLD AUTO: 1 %
ERYTHROCYTE [DISTWIDTH] IN BLOOD BY AUTOMATED COUNT: 15.1 % (ref 12.3–15.4)
GAMMA INTERFERON BACKGROUND BLD IA-ACNC: 0.03 IU/ML
GLOBULIN SER CALC-MCNC: 2.9 G/DL (ref 1.5–4.5)
GLUCOSE SERPL-MCNC: 94 MG/DL (ref 65–99)
HCT VFR BLD AUTO: 38.1 % (ref 37.5–51)
HGB BLD-MCNC: 13.2 G/DL (ref 13–17.7)
IMM GRANULOCYTES # BLD AUTO: 0 X10E3/UL (ref 0–0.1)
IMM GRANULOCYTES NFR BLD AUTO: 0 %
LYMPHOCYTES # BLD AUTO: 2.4 X10E3/UL (ref 0.7–3.1)
LYMPHOCYTES NFR BLD AUTO: 43 %
M TB IFN-G BLD-IMP: NEGATIVE
M TB IFN-G CD4+ BCKGRND COR BLD-ACNC: 0.03 IU/ML
MCH RBC QN AUTO: 30.3 PG (ref 26.6–33)
MCHC RBC AUTO-ENTMCNC: 34.6 G/DL (ref 31.5–35.7)
MCV RBC AUTO: 87 FL (ref 79–97)
MITOGEN IGNF BLD-ACNC: >10 IU/ML
MONOCYTES # BLD AUTO: 0.6 X10E3/UL (ref 0.1–0.9)
MONOCYTES NFR BLD AUTO: 11 %
NEUTROPHILS # BLD AUTO: 2.5 X10E3/UL (ref 1.4–7)
NEUTROPHILS NFR BLD AUTO: 45 %
PLATELET # BLD AUTO: 172 X10E3/UL (ref 150–450)
POTASSIUM SERPL-SCNC: 4.1 MMOL/L (ref 3.5–5.2)
PROT SERPL-MCNC: 7.2 G/DL (ref 6–8.5)
QUANTIFERON INCUBATION, QF1T: NORMAL
QUANTIFERON TB2 AG: 0.02 IU/ML
RBC # BLD AUTO: 4.36 X10E6/UL (ref 4.14–5.8)
SERVICE CMNT-IMP: NORMAL
SODIUM SERPL-SCNC: 144 MMOL/L (ref 134–144)
VALPROATE SERPL-MCNC: 74 UG/ML (ref 50–100)
WBC # BLD AUTO: 5.5 X10E3/UL (ref 3.4–10.8)

## 2019-06-25 NOTE — PROGRESS NOTES
After reviewing your labs, I believe they are within normal  limits for your age. Keep working hard on diet and taking your medications that are prescribed. If you have any acute care needs and are having trouble getting an appointment. .. please send me a   [facility] message or have the  send me a message. Have a blessed day and  be kind  to others! If you have any questions, feel free to email thru [facility], or give us   a call back at [phone]. Moe Park M.D.   Good Help to Those in Need  \"You maybe whatever you resolve to be\"

## 2019-06-25 NOTE — PROGRESS NOTES
A letter was sent to the patient at the address on file, with lab results and Dr. Archana Jenkins recommendations for the patient.

## 2019-07-11 ENCOUNTER — DOCUMENTATION ONLY (OUTPATIENT)
Dept: FAMILY MEDICINE CLINIC | Age: 44
End: 2019-07-11

## 2019-07-11 NOTE — PROGRESS NOTES
Miss Amish Godfrey dropped off a Non-Rx Medication Info form. Please complete and call her at [phone] for form to be picked up. Form is in Dr. Maciej Singh.

## 2019-08-06 ENCOUNTER — OFFICE VISIT (OUTPATIENT)
Dept: FAMILY MEDICINE CLINIC | Age: 44
End: 2019-08-06

## 2019-08-06 VITALS
HEIGHT: 66 IN | WEIGHT: 160 LBS | TEMPERATURE: 97.5 F | SYSTOLIC BLOOD PRESSURE: 120 MMHG | OXYGEN SATURATION: 98 % | DIASTOLIC BLOOD PRESSURE: 78 MMHG | HEART RATE: 87 BPM | BODY MASS INDEX: 25.71 KG/M2 | RESPIRATION RATE: 18 BRPM

## 2019-08-06 DIAGNOSIS — T23.012D: Primary | ICD-10-CM
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
## 2019-10-11 ENCOUNTER — OFFICE VISIT (OUTPATIENT)
Dept: FAMILY MEDICINE CLINIC | Age: 44
End: 2019-10-11

## 2019-10-11 VITALS
HEART RATE: 80 BPM | RESPIRATION RATE: 16 BRPM | SYSTOLIC BLOOD PRESSURE: 103 MMHG | HEIGHT: 66 IN | BODY MASS INDEX: 25.51 KG/M2 | DIASTOLIC BLOOD PRESSURE: 71 MMHG | WEIGHT: 158.7 LBS | OXYGEN SATURATION: 91 %

## 2019-10-11 DIAGNOSIS — M79.642 LEFT HAND PAIN: Primary | ICD-10-CM

## 2019-10-11 NOTE — PROGRESS NOTES
Andrea Pat is a 37 y.o. male , id x 2(name and ). Reviewed record, history, and  medications. Chief Complaint   Patient presents with   [facility] Follow Up     lt hand pain      Vitals:    10/11/19 1108   BP: 103/71   Pulse: 80   Resp: 16   SpO2: 91%   Weight: 158 lb 11.2 oz (72 kg)   Height: 5' 6\" (1.676 m)     Coordination of Care Questionnaire:   1) Have you been to an emergency room, urgent care, or hospitalized since your last visit? Yes Wednesday       2. Have seen or consulted any other health care provider since your last visit? NO    Patient is accompanied by adult caretaker I have received verbal consent from Andrea Pat to discuss any/all medical information while they are present in the room.

## 2019-10-11 NOTE — PROGRESS NOTES
Chief Complaint   Patient presents with   [facility] Follow [facility]     lt hand pain      Jerald Chakraborty is a 37 y.o. male who presents for evaluation. Went to ER on Tuesday for left hand pain. They completed x-ray and it was negative. Caregiver states the ER did not prescribe any medication, hand pain has not resolved. No other complaints at this time. Reviewed PmHx, RxHx, FmHx, SocHx, AllgHx and updated and dated in the chart. Patient Active Problem List    Diagnosis    Constipation    Moderate intellectual disability    Acne    Seizure ([facility] [address]       Review of Systems - negative except as listed above in the HPI    Objective:     Vitals:    10/11/19 1108   BP: 103/71   Pulse: 80   Resp: 16   SpO2: 91%   Weight: 158 lb 11.2 oz (72 kg)   Height: 5' 6\" (1.676 m)     Physical Examination: General appearance - alert, well appearing, and in no distress  Mental status - alert, oriented to person, place, and time  Chest - clear to auscultation, no wheezes, rales or rhonchi, symmetric air entry  Heart - normal rate, regular rhythm, normal S1, S2, no murmurs, rubs, clicks or gallops  Extremities - peripheral pulses normal, no pedal edema, no clubbing or cyanosis    Assessment/ Plan:   Diagnoses and all orders for this visit:    1. Left hand pain  - Resolved      Follow-up and Dispositions    · Return if symptoms worsen or fail to improve. I have discussed the diagnosis with the patient and the intended plan as seen in the above orders. The patient understands and agrees with the plan. The patient has received an after-visit summary and questions were answered concerning future plans. Medication Side Effects and Warnings were discussed with patient  Patient Labs were reviewed and or requested:  Patient Past Records were reviewed and or requested    Brayden Murry NP  [address] [facility]    There are no Patient Instructions on file for this visit.

## 2020-02-06 DIAGNOSIS — L70.9 ACNE, UNSPECIFIED ACNE TYPE: ICD-10-CM

## 2020-02-07 RX ORDER — ERYTHROMYCIN AND BENZOYL PEROXIDE 30; 50 MG/G; MG/G
GEL TOPICAL 2 TIMES DAILY
Qty: 46.6 G | Refills: 0 | Status: SHIPPED | OUTPATIENT
Start: 2020-02-07 | End: 2020-07-01 | Stop reason: SDUPTHER

## 2020-03-06 ENCOUNTER — TELEPHONE (OUTPATIENT)
Dept: FAMILY MEDICINE CLINIC | Age: 45
End: 2020-03-06

## 2020-03-06 NOTE — TELEPHONE ENCOUNTER
Pharm called. Pt has on file ibuprofen 600mg 1tab QID, 30tabs. Pharm wants to know do you want to refill this med or d/c it. Please call pharm at [phone], [facility].

## 2020-03-09 NOTE — TELEPHONE ENCOUNTER
Called and spoke to Pharmacist Sophie Wilcox) [address] states understanding per Dr Cassie Jerez: DC the Ibuprofen.

## 2020-03-11 NOTE — TELEPHONE ENCOUNTER
Called and spoke to [facility] Pharmacist> [employer]) Natasha states understanding per Dr Gabriela Larsen: DC Ibuprofen Order.

## 2020-03-11 NOTE — TELEPHONE ENCOUNTER
----- Message from Monserrat Dill sent at 3/10/2020  4:28 PM EDT -----  Regarding: Dr. Ailin Streeter: [phone]  Caller's first and last name: Sergio Creed pharmacist; [address]  Reason for call: Requesting to know should the Ibuproprian 200 mg.  be discontinued; pt hasn't used this Rx since July 2019.   Callback required yes/no and why: yes  Best contact number(s): [phone]  Details to clarify the request: fax: [phone]

## 2020-03-27 ENCOUNTER — TELEPHONE (OUTPATIENT)
Dept: FAMILY MEDICINE CLINIC | Age: 45
End: 2020-03-27

## 2020-03-27 NOTE — TELEPHONE ENCOUNTER
Patient would like to have Tramadol HCL 50 mg. [facility]  Patient's phone: [phone]

## 2020-07-01 DIAGNOSIS — L70.9 ACNE, UNSPECIFIED ACNE TYPE: ICD-10-CM

## 2020-07-01 RX ORDER — ERYTHROMYCIN AND BENZOYL PEROXIDE 30; 50 MG/G; MG/G
GEL TOPICAL
Qty: 46.6 G | Refills: 0 | Status: SHIPPED | OUTPATIENT
Start: 2020-07-01 | End: 2020-11-15 | Stop reason: SDUPTHER

## 2020-07-14 RX ORDER — CLOBAZAM 10 MG/1
10 TABLET ORAL
Qty: 90 TAB | Refills: 3 | OUTPATIENT
Start: 2020-07-14

## 2020-07-17 DIAGNOSIS — K59.00 CONSTIPATION, UNSPECIFIED CONSTIPATION TYPE: ICD-10-CM

## 2020-07-18 RX ORDER — DOCUSATE SODIUM 100 MG/1
CAPSULE, LIQUID FILLED ORAL
Qty: 31 CAP | Refills: 0 | Status: SHIPPED | OUTPATIENT
Start: 2020-07-18 | End: 2020-09-04 | Stop reason: SDUPTHER

## 2020-09-04 DIAGNOSIS — K59.00 CONSTIPATION, UNSPECIFIED CONSTIPATION TYPE: ICD-10-CM

## 2020-09-04 RX ORDER — DOCUSATE SODIUM 100 MG/1
CAPSULE, LIQUID FILLED ORAL
Qty: 31 CAP | Refills: 0 | Status: SHIPPED | OUTPATIENT
Start: 2020-09-04 | End: 2020-09-24 | Stop reason: SDUPTHER

## 2020-09-24 DIAGNOSIS — K59.00 CONSTIPATION, UNSPECIFIED CONSTIPATION TYPE: ICD-10-CM

## 2020-09-24 RX ORDER — DOCUSATE SODIUM 100 MG/1
CAPSULE, LIQUID FILLED ORAL
Qty: 31 CAP | Refills: 0 | Status: SHIPPED | OUTPATIENT
Start: 2020-09-24 | End: 2020-10-26

## 2020-09-28 ENCOUNTER — DOCUMENTATION ONLY (OUTPATIENT)
Dept: FAMILY MEDICINE CLINIC | Age: 45
End: 2020-09-28

## 2020-09-28 ENCOUNTER — VIRTUAL VISIT (OUTPATIENT)
Dept: FAMILY MEDICINE CLINIC | Age: 45
End: 2020-09-28

## 2020-09-28 NOTE — PROGRESS NOTES
Patients care giver was called 3 times today for an appointment and one voicemail was left regarding the appointment today. No one called back in till about 2:10 for there appointment but by then they were late and when the provider called the care giver the care giver started arguing and was driving so we are waiting for her to call back when she is not driving so we can schedule patient for a in office visit so we can do the patients labs and better treat him.

## 2020-09-28 NOTE — PROGRESS NOTES
Attempt to reach pt unsuccessful. LVM for patient to to give office a call back so we can check patient in and get them ready for the appointment.

## 2020-09-28 NOTE — PROGRESS NOTES
Pt scheduled for VV today at 1:45, there was no answer when attempting to contact [address] to check pt in several times. Caregiver returned call 30min after appt time, text link was sent via doxy, caregiver was driving in car w/o patient present. Caregiver arguing that wrong number was called, advised caregiver we do not have alternative number listed. Caregiver briefly mentions pt has been drooling more over the last month or so. Unable to assess further as caregiver had poor connection and video cut out. Attempts to re-connect failed and audio was not functioning, caregiver was actively driving car, will have nurse return call to reschedule pt for in office visit as he is due for labs and will need more information.

## 2020-10-09 ENCOUNTER — TRANSCRIBE ORDER (OUTPATIENT)
Dept: FAMILY MEDICINE CLINIC | Age: 45
End: 2020-10-09

## 2020-10-09 ENCOUNTER — TELEPHONE (OUTPATIENT)
Dept: FAMILY MEDICINE CLINIC | Age: 45
End: 2020-10-09

## 2020-10-09 DIAGNOSIS — K59.00 CONSTIPATION, UNSPECIFIED CONSTIPATION TYPE: Primary | ICD-10-CM

## 2020-10-09 RX ORDER — AMOXICILLIN 250 MG
CAPSULE ORAL
Qty: 31 TAB | Refills: 5 | Status: SHIPPED | OUTPATIENT
Start: 2020-10-09 | End: 2021-05-03

## 2020-10-09 NOTE — TELEPHONE ENCOUNTER
----- Message from Susan Watts sent at 10/9/2020  2:14 PM EDT -----  Regarding: Patt Fields NP/Telephone  General Message/Vendor Calls    Caller's first and last name: Milton Santiago from [place] [address]       Reason for call: A Rx was sent over to a mail order pharmacy and the pharmacy has questions about the Rx.        Callback required yes/no and why:Y       Best contact number(s): [phone]      Details to clarify the request:      Susan Watts

## 2020-10-13 ENCOUNTER — OFFICE VISIT (OUTPATIENT)
Dept: FAMILY MEDICINE CLINIC | Age: 45
End: 2020-10-13
Payer: MEDICARE

## 2020-10-13 ENCOUNTER — HOSPITAL ENCOUNTER (OUTPATIENT)
Dept: LAB | Age: 45
Discharge: HOME | End: 2020-10-13
Payer: MEDICARE

## 2020-10-13 VITALS
TEMPERATURE: 97.9 F | RESPIRATION RATE: 20 BRPM | HEIGHT: 66 IN | WEIGHT: 161.8 LBS | OXYGEN SATURATION: 97 % | HEART RATE: 82 BPM | BODY MASS INDEX: 26 KG/M2 | SYSTOLIC BLOOD PRESSURE: 104 MMHG | DIASTOLIC BLOOD PRESSURE: 69 MMHG

## 2020-10-13 DIAGNOSIS — Z00.00 WELL ADULT EXAM: Primary | ICD-10-CM

## 2020-10-13 DIAGNOSIS — F71 MODERATE INTELLECTUAL DISABILITY: ICD-10-CM

## 2020-10-13 DIAGNOSIS — R56.9 SEIZURE (HCC): ICD-10-CM

## 2020-10-13 DIAGNOSIS — Z11.1 SCREENING-PULMONARY TB: ICD-10-CM

## 2020-10-13 LAB
ALBUMIN SERPL-MCNC: 3.6 G/DL (ref 3.5–5)
ALBUMIN/GLOB SERPL: 1 {RATIO} (ref 1.1–2.2)
ALP SERPL-CCNC: 87 U/L (ref 45–117)
ALT SERPL-CCNC: 21 U/L (ref 12–78)
ANION GAP SERPL CALC-SCNC: 6 MMOL/L (ref 5–15)
AST SERPL-CCNC: 15 U/L (ref 15–37)
BASOPHILS # BLD: 0 K/UL (ref 0–0.1)
BASOPHILS NFR BLD: 0 % (ref 0–1)
BILIRUB SERPL-MCNC: 0.3 MG/DL (ref 0.2–1)
BUN SERPL-MCNC: 11 MG/DL (ref 6–20)
BUN/CREAT SERPL: 15 (ref 12–20)
CALCIUM SERPL-MCNC: 9 MG/DL (ref 8.5–10.1)
CHLORIDE SERPL-SCNC: 110 MMOL/L (ref 97–108)
CHOLEST SERPL-MCNC: 147 MG/DL
CO2 SERPL-SCNC: 28 MMOL/L (ref 21–32)
CREAT SERPL-MCNC: 0.71 MG/DL (ref 0.7–1.3)
DIFFERENTIAL METHOD BLD: NORMAL
EOSINOPHIL # BLD: 0 K/UL (ref 0–0.4)
EOSINOPHIL NFR BLD: 0 % (ref 0–7)
ERYTHROCYTE [DISTWIDTH] IN BLOOD BY AUTOMATED COUNT: 14 % (ref 11.5–14.5)
GLOBULIN SER CALC-MCNC: 3.5 G/DL (ref 2–4)
GLUCOSE SERPL-MCNC: 77 MG/DL (ref 65–100)
HCT VFR BLD AUTO: 41.6 % (ref 36.6–50.3)
HDLC SERPL-MCNC: 49 MG/DL
HDLC SERPL: 3 {RATIO} (ref 0–5)
HGB BLD-MCNC: 13.3 G/DL (ref 12.1–17)
IMM GRANULOCYTES # BLD AUTO: 0 K/UL (ref 0–0.04)
IMM GRANULOCYTES NFR BLD AUTO: 0 % (ref 0–0.5)
LDLC SERPL CALC-MCNC: 79.6 MG/DL (ref 0–100)
LIPID PROFILE,FLP: NORMAL
LYMPHOCYTES # BLD: 1.9 K/UL (ref 0.8–3.5)
LYMPHOCYTES NFR BLD: 23 % (ref 12–49)
MCH RBC QN AUTO: 30.4 PG (ref 26–34)
MCHC RBC AUTO-ENTMCNC: 32 G/DL (ref 30–36.5)
MCV RBC AUTO: 95.2 FL (ref 80–99)
MONOCYTES # BLD: 0.7 K/UL (ref 0–1)
MONOCYTES NFR BLD: 9 % (ref 5–13)
NEUTS SEG # BLD: 5.6 K/UL (ref 1.8–8)
NEUTS SEG NFR BLD: 68 % (ref 32–75)
NRBC # BLD: 0 K/UL (ref 0–0.01)
NRBC BLD-RTO: 0 PER 100 WBC
PLATELET # BLD AUTO: 183 K/UL (ref 150–400)
PMV BLD AUTO: 9.9 FL (ref 8.9–12.9)
POTASSIUM SERPL-SCNC: 3.8 MMOL/L (ref 3.5–5.1)
PROT SERPL-MCNC: 7.1 G/DL (ref 6.4–8.2)
RBC # BLD AUTO: 4.37 M/UL (ref 4.1–5.7)
SODIUM SERPL-SCNC: 144 MMOL/L (ref 136–145)
TRIGL SERPL-MCNC: 92 MG/DL (ref ?–150)
TSH SERPL DL<=0.05 MIU/L-ACNC: 0.8 UIU/ML (ref 0.36–3.74)
VALPROATE SERPL-MCNC: 62 UG/ML (ref 50–100)
VLDLC SERPL CALC-MCNC: 18.4 MG/DL
WBC # BLD AUTO: 8.3 K/UL (ref 4.1–11.1)

## 2020-10-13 PROCEDURE — G8427 DOCREV CUR MEDS BY ELIG CLIN: HCPCS | Performed by: NURSE PRACTITIONER

## 2020-10-13 PROCEDURE — G0463 HOSPITAL OUTPT CLINIC VISIT: HCPCS | Performed by: NURSE PRACTITIONER

## 2020-10-13 PROCEDURE — G0439 PPPS, SUBSEQ VISIT: HCPCS | Performed by: NURSE PRACTITIONER

## 2020-10-13 PROCEDURE — G8432 DEP SCR NOT DOC, RNG: HCPCS | Performed by: NURSE PRACTITIONER

## 2020-10-13 PROCEDURE — 86480 TB TEST CELL IMMUN MEASURE: CPT

## 2020-10-13 PROCEDURE — G8419 CALC BMI OUT NRM PARAM NOF/U: HCPCS | Performed by: NURSE PRACTITIONER

## 2020-10-13 PROCEDURE — 99213 OFFICE O/P EST LOW 20 MIN: CPT | Performed by: NURSE PRACTITIONER

## 2020-10-13 NOTE — PROGRESS NOTES
Ester Carvajal is a 40 y.o. male , id x 2(name and ). Reviewed record, history, and  medications. Chief Complaint   Patient presents with    Drooling     here with raeanne to discuss drooling states that it is a s/e of one of the pt's meds. Vitals:    10/13/20 0918   BP: 104/69   Pulse: 82   Resp: 20   Temp: 97.9 °F (36.6 °C)   SpO2: 97%   Weight: 161 lb 12.8 oz (73.4 kg)   Height: 5' 6\" (1.676 m)   PainSc:   0 - No pain       Coordination of Care Questionnaire:   1) Have you been to an emergency room, urgent care, or hospitalized since your last visit?   no       2. Have seen or consulted any other health care provider since your last visit? NO      3 most recent PHQ Screens 10/13/2020   Little interest or pleasure in doing things Not at all   Feeling down, depressed, irritable, or hopeless Not at all   Total Score PHQ 2 0       Patient is accompanied by self and adult caretaker I have received verbal consent from Ester Carvajal to discuss any/all medical information while they are present in the room.

## 2020-10-13 NOTE — PROGRESS NOTES
This is the Subsequent Medicare Annual Wellness Exam, performed 12 months or more after the Initial AWV or the last Subsequent AWV    I have reviewed the patient's medical history in detail and updated the computerized patient record. History     Patient Active Problem List   Diagnosis Code    Constipation K59.00    Moderate intellectual disability F71    Acne L70.9    Seizure ([facility] [address] R56.9     Past Medical History:   Diagnosis Date    Blindness     Cerebral palsy ([facility] [address]     Intellectual disability     Schizophrenia, undifferentiated ([facility] [address]     Seizure disorder ([facility] [address]       History reviewed. No pertinent surgical history. Current Outpatient Medications   Medication Sig Dispense Refill    senna-docusate (Senna-S) 8.6-50 mg per tablet Take 1 tab by mouth daily as needed for constipation. 31 Tab 5    docusate sodium (Stool Softener) 100 mg capsule TAKE 1 CAPSULE BY MOUTH ONCE DAILY 31 Cap 0    benzoyl peroxide-erythromycin (BENZAMYCIN) 3-5 % topical gel Apply thin layers to areas affected by acne BID. 46.6 g 0    pseudoephedrine (SUDAFED) 30 mg tablet Take 1 Tab by mouth every six (6) hours as needed for Congestion. 20 Tab 0    cetirizine (ZYRTEC) 10 mg tablet Take 1 Tab by mouth daily. 30 Tab 5    acetaminophen (TYLENOL) 500 mg tablet Take 1 Tab by mouth every six (6) hours as needed (as needed for pain or fever). Indications: Headache Disorder, Pain 60 Tab 1    guaiFENesin ER (MUCINEX) 600 mg ER tablet Take 1 Tab by mouth two (2) times a day. As needed for cough or congestion 60 Tab 1    hydrOXYzine pamoate (VISTARIL) 25 mg capsule Take 25 mg by mouth two (2) times daily as needed for Anxiety.  divalproex DR (DEPAKOTE) 125 mg tablet TAKE ONE TABLET BY MOUTH AT BEDTIME. 30 Tab 5    cloBAZam (ONFI) 10 mg tab tablet Take 1 Tab by mouth nightly. Max Daily Amount: 10 mg. 90 Tab 3    risperiDONE (RISPERDAL) 1 mg tablet Take 1 mg by mouth nightly.        No Known Allergies    Family History   Family history unknown: Yes     Social History     Tobacco Use    Smoking status: Never Smoker    Smokeless tobacco: Never Used   Substance Use Topics    Alcohol use: No       Depression Risk Factor Screening:     3 most recent PHQ Screens 10/13/2020   Little interest or pleasure in doing things Not at all   Feeling down, depressed, irritable, or hopeless Not at all   Total Score PHQ 2 0       Alcohol Risk Screen   Do you average more than 2 drinks per night or 14 drinks a week: No    On any one occasion in the past three months have you have had more than 4 drinks containing alcohol:  No        Functional Ability and Level of Safety:   Hearing: Hearing is good. Activities of Daily Living: The home contains: handrails, grab bars and lives in group home. Patient needs help with:  phone, transportation, shopping, preparing meals, laundry, housework, managing medications, managing money, bathing and hygiene     Ambulation: with mild difficulty     Fall Risk:  Fall Risk Assessment, last 12 mths 6/20/2019   Able to walk? Yes   Fall in past 12 months? No     Abuse Screen:  Patient is not abused       Cognitive Screening   Has your family/caregiver stated any concerns about your memory: no     Cognitive Screening: baseline for MR    Patient Care Team   Patient Care Team:  Alka Weston MD as PCP - General (Family Medicine)  Alka Weston MD as PCP - [facility]    Assessment/Plan   Education and counseling provided:  Are appropriate based on today's review and evaluation    Diagnoses and all orders for this visit:    1. Well adult exam  -     LIPID PANEL; Future  -     METABOLIC PANEL, COMPREHENSIVE; Future  -     CBC WITH AUTOMATED DIFF; Future  -     TSH 3RD GENERATION; Future    2. Seizure ([address]  -     VALPROIC ACID; Future    3. Moderate intellectual disability    4.  Screening-pulmonary TB  -     QUANTIFERON-TB GOLD PLUS; Future        I have discussed the diagnosis with the patient and the intended plan as seen in the above orders. The patient has received an after-visit summary and questions were answered concerning future plans. Patient conveyed understanding of the plan at the time of the visit.     Jared Russo MSN, ANP  10/13/2020

## 2020-10-13 NOTE — PATIENT INSTRUCTIONS
Medicare Wellness Visit, Male The best way to live healthy is to have a lifestyle where you eat a well-balanced diet, exercise regularly, limit alcohol use, and quit all forms of tobacco/nicotine, if applicable. Regular preventive services are another way to keep healthy. Preventive services (vaccines, screening tests, monitoring & exams) can help personalize your care plan, which helps you manage your own care. Screening tests can find health problems at the earliest stages, when they are easiest to treat. [facility] follows the current, evidence-based guidelines published by the [facility] Hernesto Yumiko (RUSTSTF) when recommending preventive services for our patients. Because we follow these guidelines, sometimes recommendations change over time as research supports it. (For example, a prostate screening blood test is no longer routinely recommended for men with no symptoms). Of course, you and your doctor may decide to screen more often for some diseases, based on your risk and co-morbidities (chronic disease you are already diagnosed with). Preventive services for you include: - Medicare offers their members a free annual wellness visit, which is time for you and your primary care provider to discuss and plan for your preventive service needs. Take advantage of this benefit every year! 
-All adults over age 72 should receive the recommended pneumonia vaccines. Current USPSTF guidelines recommend a series of two vaccines for the best pneumonia protection.  
-All adults should have a flu vaccine yearly and tetanus vaccine every 10 years. 
-All adults age 48 and older should receive the shingles vaccines (series of two vaccines).       
-All adults age 38-68 who are overweight should have a diabetes screening test once every three years.  
-Other screening tests & preventive services for persons with diabetes include: an eye exam to screen for diabetic retinopathy, a kidney function test, a foot exam, and stricter control over your cholesterol.  
-Cardiovascular screening for adults with routine risk involves an electrocardiogram (ECG) at intervals determined by the provider.  
-Colorectal cancer screening should be done for adults age 54-65 with no increased risk factors for colorectal cancer. There are a number of acceptable methods of screening for this type of cancer. Each test has its own benefits and drawbacks. Discuss with your provider what is most appropriate for you during your annual wellness visit. The different tests include: colonoscopy (considered the best screening method), a fecal occult blood test, a fecal DNA test, and sigmoidoscopy. 
-All adults born between [facility] should be screened once for Hepatitis C. 
-An Abdominal Aortic Aneurysm (AAA) Screening is recommended for men age 73-68 who has ever smoked in their lifetime. Here is a list of your current Health Maintenance items (your personalized list of preventive services) with a due date: 
Health Maintenance Due Topic Date Due  
 Annual Well Visit  06/20/2020  Yearly Flu Vaccine (1) 09/01/2020

## 2020-10-14 NOTE — PROGRESS NOTES
After reviewing your labs, I believe they are within normal  limits for your age. Keep working hard on diet and taking your medications that are prescribed. If you have any acute care needs and are having trouble getting an appointment. .. please send me a   [facility] message or have the  send me a message. Have a blessed day and  be kind  to others! If you have any questions, feel free to email thru [facility], or give us   a call back at [phone]. Roslyn Waldron M.D.   Good Help to Those in Need  \"You maybe whatever you resolve to be\"

## 2020-10-16 ENCOUNTER — TELEPHONE (OUTPATIENT)
Dept: FAMILY MEDICINE CLINIC | Age: 45
End: 2020-10-16

## 2020-10-16 DIAGNOSIS — R56.9 SEIZURE (HCC): Primary | ICD-10-CM

## 2020-10-17 LAB
GAMMA INTERFERON BACKGROUND BLD IA-ACNC: 0.02 IU/ML
M TB IFN-G BLD-IMP: NEGATIVE
M TB IFN-G CD4+ BCKGRND COR BLD-ACNC: 0.03 IU/ML
MITOGEN IGNF BLD-ACNC: 0.92 IU/ML
QUANTIFERON INCUBATION, QF1T: NORMAL
QUANTIFERON TB2 AG: 0.02 IU/ML
SERVICE CMNT-IMP: NORMAL

## 2020-10-18 RX ORDER — LACOSAMIDE 200 MG/1
200 TABLET ORAL 2 TIMES DAILY
Qty: 62 TAB | Refills: 0 | Status: SHIPPED | OUTPATIENT
Start: 2020-10-18 | End: 2020-10-27 | Stop reason: SDUPTHER

## 2020-10-22 ENCOUNTER — APPOINTMENT (OUTPATIENT)
Dept: GENERAL RADIOLOGY | Age: 45
End: 2020-10-22
Attending: PHYSICIAN ASSISTANT
Payer: MEDICARE

## 2020-10-22 ENCOUNTER — APPOINTMENT (OUTPATIENT)
Dept: CT IMAGING | Age: 45
End: 2020-10-22
Attending: PHYSICIAN ASSISTANT
Payer: MEDICARE

## 2020-10-22 ENCOUNTER — APPOINTMENT (OUTPATIENT)
Dept: GENERAL RADIOLOGY | Age: 45
End: 2020-10-22
Attending: INTERNAL MEDICINE
Payer: MEDICARE

## 2020-10-22 ENCOUNTER — HOSPITAL ENCOUNTER (EMERGENCY)
Age: 45
Discharge: HOME | End: 2020-10-22
Payer: MEDICARE

## 2020-10-22 VITALS
RESPIRATION RATE: 16 BRPM | SYSTOLIC BLOOD PRESSURE: 135 MMHG | BODY MASS INDEX: 23.62 KG/M2 | WEIGHT: 165 LBS | DIASTOLIC BLOOD PRESSURE: 90 MMHG | HEIGHT: 70 IN | OXYGEN SATURATION: 100 % | TEMPERATURE: 99.1 F | HEART RATE: 86 BPM

## 2020-10-22 DIAGNOSIS — S82.892A CLOSED FRACTURE OF LEFT ANKLE, INITIAL ENCOUNTER: Primary | ICD-10-CM

## 2020-10-22 LAB
ANION GAP SERPL CALC-SCNC: 7 MMOL/L (ref 5–15)
BASOPHILS # BLD: 0 K/UL (ref 0–0.1)
BASOPHILS NFR BLD: 0 % (ref 0–1)
BUN SERPL-MCNC: 10 MG/DL (ref 6–20)
BUN/CREAT SERPL: 13 (ref 12–20)
CA-I BLD-MCNC: 9.1 MG/DL (ref 8.5–10.1)
CHLORIDE SERPL-SCNC: 108 MMOL/L (ref 97–108)
CO2 SERPL-SCNC: 29 MMOL/L (ref 21–32)
CREAT SERPL-MCNC: 0.75 MG/DL (ref 0.7–1.3)
DIFFERENTIAL METHOD BLD: ABNORMAL
EOSINOPHIL # BLD: 0 K/UL (ref 0–0.4)
EOSINOPHIL NFR BLD: 0 % (ref 0–7)
ERYTHROCYTE [DISTWIDTH] IN BLOOD BY AUTOMATED COUNT: 13.5 % (ref 11.5–14.5)
GLUCOSE SERPL-MCNC: 97 MG/DL (ref 65–100)
HCT VFR BLD AUTO: 40.9 % (ref 36.6–50.3)
HGB BLD-MCNC: 13.8 G/DL (ref 12.1–17)
IMM GRANULOCYTES # BLD AUTO: 0.1 K/UL (ref 0–0.04)
IMM GRANULOCYTES NFR BLD AUTO: 1 % (ref 0–0.5)
LYMPHOCYTES # BLD: 1.9 K/UL (ref 0.8–3.5)
LYMPHOCYTES NFR BLD: 22 % (ref 12–49)
MCH RBC QN AUTO: 31.4 PG (ref 26–34)
MCHC RBC AUTO-ENTMCNC: 33.7 G/DL (ref 30–36.5)
MCV RBC AUTO: 93 FL (ref 80–99)
MONOCYTES # BLD: 0.7 K/UL (ref 0–1)
MONOCYTES NFR BLD: 9 % (ref 5–13)
NEUTS SEG # BLD: 5.7 K/UL (ref 1.8–8)
NEUTS SEG NFR BLD: 68 % (ref 32–75)
PLATELET # BLD AUTO: 185 K/UL (ref 150–400)
PMV BLD AUTO: 9.9 FL (ref 8.9–12.9)
POTASSIUM SERPL-SCNC: 3.6 MMOL/L (ref 3.5–5.1)
RBC # BLD AUTO: 4.4 M/UL (ref 4.1–5.7)
SODIUM SERPL-SCNC: 144 MMOL/L (ref 136–145)
WBC # BLD AUTO: 8.4 K/UL (ref 4.1–11.1)

## 2020-10-22 PROCEDURE — 96375 TX/PRO/DX INJ NEW DRUG ADDON: CPT

## 2020-10-22 PROCEDURE — 75810000303 HC CLSD TRMT  FRACTURE/DISLOCATION W/  ANES

## 2020-10-22 PROCEDURE — 99285 EMERGENCY DEPT VISIT HI MDM: CPT

## 2020-10-22 PROCEDURE — 96374 THER/PROPH/DIAG INJ IV PUSH: CPT

## 2020-10-22 PROCEDURE — 73130 X-RAY EXAM OF HAND: CPT

## 2020-10-22 PROCEDURE — 97161 PT EVAL LOW COMPLEX 20 MIN: CPT

## 2020-10-22 PROCEDURE — 97530 THERAPEUTIC ACTIVITIES: CPT

## 2020-10-22 PROCEDURE — 99152 MOD SED SAME PHYS/QHP 5/>YRS: CPT

## 2020-10-22 PROCEDURE — 36415 COLL VENOUS BLD VENIPUNCTURE: CPT

## 2020-10-22 PROCEDURE — 73610 X-RAY EXAM OF ANKLE: CPT

## 2020-10-22 PROCEDURE — 73700 CT LOWER EXTREMITY W/O DYE: CPT

## 2020-10-22 PROCEDURE — 80048 BASIC METABOLIC PNL TOTAL CA: CPT

## 2020-10-22 PROCEDURE — 85025 COMPLETE CBC W/AUTO DIFF WBC: CPT

## 2020-10-22 PROCEDURE — 73600 X-RAY EXAM OF ANKLE: CPT

## 2020-10-22 PROCEDURE — 74011250636 HC RX REV CODE- 250/636: Performed by: PHYSICIAN ASSISTANT

## 2020-10-22 RX ORDER — IBUPROFEN 800 MG/1
800 TABLET ORAL
Qty: 20 TAB | Refills: 0 | Status: SHIPPED | OUTPATIENT
Start: 2020-10-22 | End: 2020-10-29

## 2020-10-22 RX ORDER — HYDROCODONE BITARTRATE AND ACETAMINOPHEN 5; 325 MG/1; MG/1
1 TABLET ORAL
Qty: 5 TAB | Refills: 0 | Status: SHIPPED | OUTPATIENT
Start: 2020-10-22 | End: 2020-10-27

## 2020-10-22 RX ORDER — MORPHINE SULFATE 4 MG/ML
4 INJECTION INTRAVENOUS ONCE
Status: COMPLETED | OUTPATIENT
Start: 2020-10-22 | End: 2020-10-22

## 2020-10-22 RX ORDER — PROPOFOL 10 MG/ML
0.5 INJECTION, EMULSION INTRAVENOUS
Status: COMPLETED | OUTPATIENT
Start: 2020-10-22 | End: 2020-10-22

## 2020-10-22 RX ORDER — ONDANSETRON 2 MG/ML
4 INJECTION INTRAMUSCULAR; INTRAVENOUS
Status: COMPLETED | OUTPATIENT
Start: 2020-10-22 | End: 2020-10-22

## 2020-10-22 RX ADMIN — MORPHINE SULFATE 4 MG: 4 INJECTION INTRAVENOUS at 12:50

## 2020-10-22 RX ADMIN — PROPOFOL 37.4 MG: 10 INJECTION, EMULSION INTRAVENOUS at 14:06

## 2020-10-22 RX ADMIN — ONDANSETRON 4 MG: 2 INJECTION INTRAMUSCULAR; INTRAVENOUS at 12:50

## 2020-10-22 NOTE — PROGRESS NOTES
Problem: Mobility Impaired (Adult and Pediatric)  Goal: *Acute Goals and Plan of Care (Insert Text)  Description:   SBA with supine to sit EOB x 7 days  Sit to stand with CGAx1-2 and remain NWB on L LE x7 days  Stand pivot from bed to w/c with CGAx1-2 and remain NWB on L LE x7 days  Progress to amb 15-25ft with LRAD and NWB on L LE and CGAx1 x7 days  Outcome: Not Met     PHYSICAL THERAPY EVALUATION  Patient: Alejandro Garcia (36 y.o. male)  Date: 10/22/2020  Primary Diagnosis: No admission diagnoses are documented for this encounter. Precautions:fall      ASSESSMENT  37yo M presents to PT after fall at group home and L ankle fx presents to PT with decreased bed mob, transfers, LE strength, gt, balance, and overall functional mobility. PMH listed below but of importance is significant For seizure disorder, schizophrenia, intellectual disability, cerebral palsy. Pt lives in group home and requires supervision with ADLs, was amb without AD PTA. Currently, pt was being transported to CT upon PT arrival, PT assisted pt with getting on/off stretcher and onto table for CT test.  Pt was min A for supine to sit EOB, min Ax2 for sit to stand and stand pivot from stretcher to bed. Pt req cues to remain NWB and was able to maintain NWB for stand pivot transfer. Pt did attempt to advance gt and \"hop\" to remain NWB with UE support on RW and pt was not able to do so without losing balance. Would not be safe to amb alone with just a walker and remain NWB, however if pt has assistance at group home is able to safely stand and turn and take a couple of steps with walker and assistance. Carine Rosales from group home was present post eval upon pt returning to room after CT and was able to provide PLOF and states that the home may have a w/c. Pt will also need a walker to hold onto. Pt may benefit from skilled PT to address his functional deficits.   Recommend d/c back to group home with walker/wheelchair and HHPT for patient. PLAN :  Recommendations and Planned Interventions: bed mobility training, transfer training, gait training, therapeutic exercises, patient and family training/education, and therapeutic activities      Frequency/Duration: Patient will be followed by physical therapy:  5 times a week to address goals. Recommendation for discharge: (in order for the patient to meet his/her long term goals)  Return to group home with home health PT    This discharge recommendation:  Has been made in collaboration with the attending provider and/or case management    IF patient discharges home will need the following DME: rolling walker and wheelchair         SUBJECTIVE:   Patient being transported to CT upon PT arrival to ER, agreeable to work with PT     OBJECTIVE DATA SUMMARY:   HISTORY:    Past Medical History:   Diagnosis Date    Blindness     Cerebral palsy ([facility] [address]     Intellectual disability     Schizophrenia, undifferentiated ([facility] [address]     Seizure disorder ([facility] [address]    No past surgical history on file. Personal factors and/or comorbidities impacting plan of care:     Home Situation  [facility] Environment: [facility] Name: [facility]  One/Two Story Residence: One story  Support Systems: Other (comments)(Staff at [address] [place] [address])  Patient Expects to be Discharged to[de-identified] Group home        EXAMINATION/PRESENTATION/DECISION MAKING:   Critical Behavior:  Neurologic State: Alert, Confused  Orientation Level: Oriented to person  Cognition: Decreased command following, Impaired decision making, Poor safety awareness  Safety/Judgement: Fall prevention, Decreased awareness of need for safety    Range Of Motion:      Grossly WFL for bed mob and transfers R LE  Grossly decreased L LE    Strength:     Grossly 4-/5 R LE  L LE NT            Functional Mobility:  Bed Mobility:  Rolling: Minimum assistance  Supine to Sit: Minimum assistance  Sit to Supine:  Moderate assistance(with LE management)     Transfers:  Sit to Stand: Minimum assistance;Assist x1  Stand to Sit: Minimum assistance;Assist x2  Stand Pivot Transfers: Minimum assistance;Assist x2       Balance:    Decreased balance in static and dynamic standing with UE support on RW    Ambulation/Gait Training:  Distance (ft): 2 Feet (ft)  Assistive Device: Walker, rolling  Ambulation - Level of Assistance: Moderate assistance;Assist x2     Gait Description (WDL): Exceptions to WDL        Left Side Weight Bearing: Non-weight bearing  Base of Support: Widened;Center of gravity altered      [facility] Inpatient Short Form  How much difficulty does the patient currently have. .. Unable A Lot A Little None   1. Turning over in bed (including adjusting bedclothes, sheets and blankets)? [] 1   [] 2   [x] 3   [] 4   2. Sitting down on and standing up from a chair with arms ( e.g., wheelchair, bedside commode, etc.)   [] 1   [] 2   [x] 3   [] 4   3. Moving from lying on back to sitting on the side of the bed? [] 1   [] 2   [x] 3   [] 4          How much help from another person does the patient currently need. .. Total A Lot A Little None   4. Moving to and from a bed to a chair (including a wheelchair)? [] 1   [] 2   [x] 3   [] 4   5. Need to walk in hospital room? [] 1   [x] 2   [] 3   [] 4   6. Climbing 3-5 steps with a railing? [x] 1   [] 2   [] 3   [] 4   © 2007, Trustees of [address] [place] [address] [address] [address], under license to [employer]. All rights reserved     Score:  Initial: 15 Most Recent: X (Date: -- )   Interpretation of Tool:  Represents activities that are increasingly more difficult (i.e. Bed mobility, Transfers, Gait).   Score 24 23 22-20 19-15 14-10 9-7 6   Modifier CH CI CJ CK CL CM CN          Physical Therapy Evaluation Charge Determination   History Examination Presentation Decision-Making   MEDIUM  Complexity : 1-2 comorbidities / personal factors will impact the outcome/ POC  MEDIUM Complexity : 3 Standardized tests and measures addressing body structure, function, activity limitation and / or participation in recreation  MEDIUM Complexity : Evolving with changing characteristics  Other Functional Measure AMPA 6 HIGH      Based on the above components, the patient evaluation is determined to be of the following complexity level: MEDIUM    Pain Rating:  Pt did c/o L LE pain, did not rate pain    Activity Tolerance:   Fair  Please refer to the flowsheet for vital signs taken during this treatment. After treatment patient left in no apparent distress:   Supine in bed and Call bell within reach    COMMUNICATION/EDUCATION:   The patients plan of care was discussed with: Physician.        Thank you for this referral.  Madhavi Treviño   Time Calculation: 23 mins

## 2020-10-22 NOTE — ED PROVIDER NOTES
EMERGENCY DEPARTMENT HISTORY AND PHYSICAL EXAM      Date: 10/22/2020  Patient Name: Real Dose    History of Presenting Illness     Chief Complaint   Patient presents with    Fall    Ankle Injury       History Provided By: Patient    HPI: Real Dose, 40 y.o. male with a past medical history significant For seizure disorder, schizophrenia, intellectual disability, cerebral palsy presents to the ED with cc of several seconds regular worsening, constant left ankle pain and swelling which started prior to arrival status post ground-level fall. Patient  currently at a group  for adults when he was running into the group home, he slipped on a jacket and fell on his left ankle. No other injuries noted. No head trauma or LOC. Does not take any anticoagulants. Patient does frequently fall and hurt his left hand yesterday as well. Does not walk with any assistive devices. Denies any tobacco, alcohol, illicit drug use. Denies any numbness, tingling, weakness, headache, neck pain, back pain, any other injuries. There are no other complaints, changes, or physical findings at this time. PCP: Cheko Finch MD    No current facility-administered medications on file prior to encounter. Current Outpatient Medications on File Prior to Encounter   Medication Sig Dispense Refill    lacosamide (Vimpat) 200 mg tab tablet Take 1 Tab by mouth two (2) times a day. Max Daily Amount: 400 mg. 62 Tab 0    senna-docusate (Senna-S) 8.6-50 mg per tablet Take 1 tab by mouth daily as needed for constipation. 31 Tab 5    docusate sodium (Stool Softener) 100 mg capsule TAKE 1 CAPSULE BY MOUTH ONCE DAILY 31 Cap 0    benzoyl peroxide-erythromycin (BENZAMYCIN) 3-5 % topical gel Apply thin layers to areas affected by acne BID. 46.6 g 0    pseudoephedrine (SUDAFED) 30 mg tablet Take 1 Tab by mouth every six (6) hours as needed for Congestion.  20 Tab 0    cetirizine (ZYRTEC) 10 mg tablet Take 1 Tab by mouth daily. 30 Tab 5    acetaminophen (TYLENOL) 500 mg tablet Take 1 Tab by mouth every six (6) hours as needed (as needed for pain or fever). Indications: Headache Disorder, Pain 60 Tab 1    guaiFENesin ER (MUCINEX) 600 mg ER tablet Take 1 Tab by mouth two (2) times a day. As needed for cough or congestion 60 Tab 1    hydrOXYzine pamoate (VISTARIL) 25 mg capsule Take 25 mg by mouth two (2) times daily as needed for Anxiety.  divalproex DR (DEPAKOTE) 125 mg tablet TAKE ONE TABLET BY MOUTH AT BEDTIME. 30 Tab 5    cloBAZam (ONFI) 10 mg tab tablet Take 1 Tab by mouth nightly. Max Daily Amount: 10 mg. 90 Tab 3    risperiDONE (RISPERDAL) 1 mg tablet Take 1 mg by mouth nightly. Past History     Past Medical History:  Past Medical History:   Diagnosis Date    Blindness     C[facility]bral palsy ([facility]     Intellectual disability     Schizophrenia, undifferentiated ([facility]     Seizure disorder ([facility]        Past Surgical History:  No past surgical history on file. Family History:  Family History   Family history unknown: Yes       Social History:  Social History     Tobacco Use    Smoking status: Never Smoker    Smokeless tobacco: Never Used   Substance Use Topics    Alcohol use: No    Drug use: No       Allergies:  No Known Allergies      Review of Systems     Review of Systems   Constitutional: Negative for chills, fatigue and fever. HENT: Negative. Respiratory: Negative for cough, chest tightness, shortness of breath and wheezing. Cardiovascular: Negative for chest pain and palpitations. Gastrointestinal: Negative for abdominal pain, diarrhea, nausea and vomiting. Genitourinary: Negative for frequency and urgency. Musculoskeletal: Positive for arthralgias (L ankle). Negative for back pain, neck pain and neck stiffness. Skin: Negative for rash. Neurological: Negative for dizziness, weakness, light-headedness and headaches. Psychiatric/Behavioral: Negative.     All other systems reviewed and are negative. Physical Exam     Physical Exam  Vitals signs and nursing note reviewed. Constitutional:       General: He is not in acute distress. Appearance: Normal appearance. He is well-developed. He is not ill-appearing, toxic-appearing or diaphoretic. HENT:      Head: Normocephalic and atraumatic. Nose: Nose normal. No congestion or rhinorrhea. Mouth/Throat:      Mouth: Mucous membranes are moist.      Pharynx: Oropharynx is clear. No oropharyngeal exudate or posterior oropharyngeal erythema. Eyes:      General: No scleral icterus. Conjunctiva/sclera: Conjunctivae normal.      Pupils: Pupils are equal, round, and reactive to light. Neck:      Musculoskeletal: Normal range of motion and neck supple. No neck rigidity or muscular tenderness. Comments: No midline tenderness, no step off, no paraspinal tenderness  Cardiovascular:      Rate and Rhythm: Normal rate and regular rhythm. Pulses:           Radial pulses are 2+ on the right side and 2+ on the left side. Dorsalis pedis pulses are 2+ on the right side and 2+ on the left side. Heart sounds: No murmur. No friction rub. No gallop. Pulmonary:      Effort: Pulmonary effort is normal. No tachypnea, accessory muscle usage, respiratory distress or retractions. Breath sounds: Normal breath sounds. No stridor. No decreased breath sounds, wheezing, rhonchi or rales. Chest:      Chest wall: No tenderness. Abdominal:      General: Bowel sounds are normal. There is no distension. Palpations: Abdomen is soft. There is no mass. Tenderness: There is no abdominal tenderness. There is no right CVA tenderness, left CVA tenderness, guarding or rebound. Musculoskeletal:      Right lower leg: No edema. Left lower leg: No edema. Comments: Left ankle with obvious deformity. Left foot externally rotated.   Tender to palpation diffusely with decreased range of motion secondary to pain and swelling. 2+ DP pulse bilaterally. Capillary refill less than 2 seconds. Sensation intact distally. Left hand with contusion over the dorsal aspect of the second MCP with mild overlying tenderness to palpation. 2+ radial pulse bilaterally. Sensation intact distally. Capillary refill less than 2 seconds. No snuffbox tenderness    Back: No midline tenderness, no step off, no paraspinal tenderness   Skin:     General: Skin is warm and dry. Capillary Refill: Capillary refill takes less than 2 seconds. Coloration: Skin is not jaundiced or pale. Findings: No bruising, erythema or rash. Neurological:      General: No focal deficit present. Mental Status: He is alert and oriented to person, place, and time. Mental status is at baseline. Sensory: Sensation is intact. Motor: Motor function is intact. Comments: Contractures in bilateral upper extremities/wrists which are baseline   Psychiatric:         Mood and Affect: Mood normal.         Behavior: Behavior normal. Behavior is cooperative. Thought Content: Thought content normal.         Judgment: Judgment normal.         Diagnostic Study Results     Labs -     Recent Results (from the past 12 hour(s))   CBC WITH AUTOMATED DIFF    Collection Time: 10/22/20 11:45 AM   Result Value Ref Range    WBC 8.4 4.1 - 11.1 K/uL    RBC 4.40 4. 10 - 5.70 M/uL    HGB 13.8 12.1 - 17.0 g/dL    HCT 40.9 36.6 - 50.3 %    MCV 93.0 80.0 - 99.0 FL    MCH 31.4 26.0 - 34.0 PG    MCHC 33.7 30.0 - 36.5 g/dL    RDW 13.5 11.5 - 14.5 %    PLATELET 697 806 - 042 K/uL    MPV 9.9 8.9 - 12.9 FL    NEUTROPHILS 68 32 - 75 %    LYMPHOCYTES 22 12 - 49 %    MONOCYTES 9 5 - 13 %    EOSINOPHILS 0 0 - 7 %    BASOPHILS 0 0 - 1 %    IMMATURE GRANULOCYTES 1 (H) 0.0 - 0.5 %    ABS. NEUTROPHILS 5.7 1.8 - 8.0 K/UL    ABS. LYMPHOCYTES 1.9 0.8 - 3.5 K/UL    ABS. MONOCYTES 0.7 0.0 - 1.0 K/UL    ABS. EOSINOPHILS 0.0 0.0 - 0.4 K/UL    ABS.  BASOPHILS 0.0 0.0 - 0.1 K/UL ABS. IMM. GRANS. 0.1 (H) 0.00 - 0.04 K/UL    DF AUTOMATED     METABOLIC PANEL, BASIC    Collection Time: 10/22/20 11:45 AM   Result Value Ref Range    Sodium 144 136 - 145 mmol/L    Potassium 3.6 3.5 - 5.1 mmol/L    Chloride 108 97 - 108 mmol/L    CO2 29 21 - 32 mmol/L    Anion gap 7 5 - 15 mmol/L    Glucose 97 65 - 100 mg/dL    BUN 10 6 - 20 mg/dL    Creatinine 0.75 0.70 - 1.30 mg/dL    BUN/Creatinine ratio 13 12 - 20      GFR est AA >60 >60 ml/min/1.73m2    GFR est non-AA >60 >60 ml/min/1.73m2    Calcium 9.1 8.5 - 10.1 mg/dL       Radiologic Studies -   XR Results (most recent):  Study Result     Review of the ankle     IMPRESSION  IMPRESSION: Severely distracted transverse medial malleolar fracture. Severely  foreshortened distal fibular fracture. The plafond and is subluxed medially. Imaging     XR ANKLE LT MIN 3 V (Order: [id number]) - 10/22/2020     Results from Hospital Encounter encounter on 10/22/20   XR ANKLE LT AP/LAT    Narrative Two-view left ankle    Casted fractures with improved alignment       CT Results  (Last 48 hours)               10/22/20 1456  CT ANKLE LT WO CONT Final result    Impression:  IMPRESSION:   Comminuted and essentially nondisplaced trimalleolar ankle fracture. Narrative:  Left lower extremity CT without contrast.       Comparison: Radiographs obtained earlier the same day. Technique: CT images of the left ankle, hind and midfoot are performed without   contrast and reformatted in multiple planes for review. 3D volume rendered   images were also performed. Dose Reduction: All CT scans at this facility are performed using dose reduction   optimization techniques as appropriate to a performed exam including the   following: Automated exposure control, adjustments of the mA and/or kV according   to patient size, or use of iterative reconstruction technique. Findings: The patient is imaged within a cast. There is a comminuted   trimalleolar ankle fracture. There is a mildly comminuted fracture through the   base of the medial malleolus without significant displacement. There is an   oblique comminuted fracture in the distal fibular metadiaphysis with extension   into the ankle joint. There is no significant displacement. There is a mildly   comminuted intra-articular fracture of the posterior distal tibia. There is up   to 2-3 mm of articular surface incongruity. Moderate subcutaneous edema surrounds the ankle. There is no evidence of tendon   entrapment within the fracture beds. Medical Decision Making   I am the first provider for this patient. I reviewed the vital signs, available nursing notes, past medical history, past surgical history, family history and social history. Vital Signs-Reviewed the patient's vital signs. Patient Vitals for the past 12 hrs:   Temp Pulse Resp BP SpO2   10/22/20 1419  87 18 112/72 100 %   10/22/20 1414  88 16 107/79 100 %   10/22/20 1409  95 16 116/75 100 %   10/22/20 1404  94 16 121/83 100 %   10/22/20 1402  (!) 105 16 (!) 122/90 100 %   10/22/20 1140 99.1 °F (37.3 °C) (!) 102 15 (!) 147/73 99 %       Records Reviewed: Nursing Notes and Old Medical Records    The patient presents with L ankle pain with a differential diagnosis of fracture, sprain, contusion      Provider Notes (Medical Decision Making):     MDM  Number of Diagnoses or Management Options  Closed fracture of left ankle, initial encounter:   Diagnosis management comments:   70-year-old male with fracture of the left ankle. Orthopedics consulted as noted below. Orthopedics came to the ED and assisted with reduction of the left ankle. CT was ordered per request of orthopedics and attending was called back with results. Phone number of patient's caretaker was provided to orthopedist so they can be contacted for appointment for patient. Patient is neurovascularly intact.   Emphasized nonweightbearing status which caretaker conveys agreement and understanding to. Prescribed medication for symptomatic relief. Discussed RICE at home. Return precautions discussed. Amount and/or Complexity of Data Reviewed  Clinical lab tests: ordered and reviewed  Tests in the radiology section of CPT®: ordered and reviewed  Review and summarize past medical records: yes    Patient Progress  Patient progress: stable           ED Course:   Initial assessment performed. The patients presenting problems have been discussed, and they are in agreement with the care plan formulated and outlined with them. I have encouraged them to ask questions as they arise throughout their visit. ED Course as of Oct 22 1625   Thu Oct 22, 2020   1258 CONSULT NOTE:  Consultant: Dr. Claudia Salvador  Specialty: Orthopedics  Discussed pt's history, disposition, and available diagnostic and imaging results. Reviewed care plans. Consultant will have DENISSE see/evaluate patient. Would like CT L ankle and call back after if reducible  Marylene Muzzy, PA    [NO]   275 Dari Moss PA-C with orthopedics currently in the ED seeing/evaluating the patient    [NO]   1405 Left ankle reduced by orthopedics PA. Conscious sedation by Dr. Tamar Danielle as noted below. Post reduction films ordered    [NO]   1415 Patient reassessed after conscious sedation. Currently awake/alert. No hypoxia. No hypotension. [NO]   [id number] Patient reassessed. Currently awake/alert. No hypoxia. No hypotension. Tolerating p.o. Currently awaiting CT ankle and orthopedics consultation for disposition.     [NO]      ED Course User Index  [NO] PAULETTE Lockhart         PROCEDURES    Procedure Note - Procedural Sedation: Completed by Dr. Quyen Bello    Indication:  Procedural sedation is required to perform the following procedure:  L ankle reduction to be completed by orthopedics    Informed Consent:  The reason for the procedure as well as the risks, benefits, and alternatives to the procedure have been explained to the patient and He consents to the procedure. The consent for sedation has been signed by the patient/representative, the medical provider and witnessed by the patient's nurse. Anesthesia Risks: The ASA score is ASA 2 - Mild systemic disease    Mallampati Score Reference: The patient has a patent airway with a Mallampati Classification Score of I (soft palate, uvula, fauces, tonsillar pillars visible). Pre-Procedure Sedation Assessment:  Sedation Start Time: 2:00 PM  Time Out was performed to confirm patient identity, laterality, indication, and contraindications, prior to procedural sedation. Post-Procedure Sedation Assessment:   Sedation End Time: 2:05 PM  The patient was sedated with a total of 37.4mg propofol/DIPRIVAN  Reversal agents and resuscitation equipment were at the bedside. Reversal agents were not required. The indicated procedure was completed and the patient tolerated the sedation well with no complications. Please refer to sedation flowsheet for nursing notes, vital signs, and specific timeline details. PAULETTE Hoskins  2:00 PM        PLAN:  1. Current Discharge Medication List      START taking these medications    Details   HYDROcodone-acetaminophen (NORCO) 5-325 mg per tablet Take 1 Tab by mouth every six (6) hours as needed for Pain for up to 5 days. Max Daily Amount: 4 Tabs. Qty: 5 Tab, Refills: 0    Associated Diagnoses: Closed fracture of left ankle, initial encounter      ibuprofen (MOTRIN) 800 mg tablet Take 1 Tab by mouth every six (6) hours as needed for Pain for up to 7 days. Qty: 20 Tab, Refills: 0           2.    Follow-up Information     Follow up With Specialties Details Why Contact Info    Flower Hylton MD Orthopedic Surgery, Sports Medicine  Will call to schedule appointment [address] [phone]      [facility] EMERGENCY DEPT Emergency Medicine  As needed, If symptoms worsen [address] [facility]  [phone]        Return to ED if worse     Diagnosis     Clinical Impression:   1. Closed fracture of left ankle, initial encounter      DISCHARGE NOTE:  3:57 PM  Pt has been reexamined. Pt has no new complaints, changes, or physical findings. Care plan outlined and precautions discussed. All available results reviewed with pt. All medications reviewed with pt. All of pts questions and concerns addressed. Pt agrees to f/u as instructed and agrees to return to ED upon further deterioration. Pt is ready to go home. Please note that this dictation was completed with [employer], the [employer] voice recognition software. Quite often unanticipated grammatical, syntax, homophones, and other interpretive errors are inadvertently transcribed by the computer software. Please disregard these errors. Please excuse any errors that have escaped final proofreading. Thank you.   PAULETTE Acevedo
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
## 2020-10-26 DIAGNOSIS — K59.00 CONSTIPATION, UNSPECIFIED CONSTIPATION TYPE: ICD-10-CM

## 2020-10-26 RX ORDER — DOCUSATE SODIUM 100 MG/1
CAPSULE, LIQUID FILLED ORAL
Qty: 31 CAP | Refills: 0 | Status: SHIPPED | OUTPATIENT
Start: 2020-10-26 | End: 2020-12-22 | Stop reason: SDUPTHER

## 2020-10-27 DIAGNOSIS — R56.9 SEIZURE (HCC): ICD-10-CM

## 2020-10-27 RX ORDER — LACOSAMIDE 200 MG/1
200 TABLET ORAL 2 TIMES DAILY
Qty: 62 TAB | Refills: 0 | Status: SHIPPED | OUTPATIENT
Start: 2020-10-27 | End: 2020-11-10 | Stop reason: SDUPTHER

## 2020-11-10 DIAGNOSIS — R56.9 SEIZURE (HCC): ICD-10-CM

## 2020-11-10 RX ORDER — LACOSAMIDE 200 MG/1
200 TABLET ORAL 2 TIMES DAILY
Qty: 62 TAB | Refills: 0 | Status: SHIPPED | OUTPATIENT
Start: 2020-11-10 | End: 2021-02-23 | Stop reason: SDUPTHER

## 2020-11-13 DIAGNOSIS — L70.9 ACNE, UNSPECIFIED ACNE TYPE: ICD-10-CM

## 2020-11-13 NOTE — TELEPHONE ENCOUNTER
[facility] requesting Erythromycin-Benzoyl Gel 46.60 GM. Apply twice a day.

## 2020-11-15 RX ORDER — ERYTHROMYCIN AND BENZOYL PEROXIDE 30; 50 MG/G; MG/G
GEL TOPICAL
Qty: 46.6 G | Refills: 0 | Status: SHIPPED | OUTPATIENT
Start: 2020-11-15 | End: 2021-05-03

## 2020-12-22 DIAGNOSIS — K59.00 CONSTIPATION, UNSPECIFIED CONSTIPATION TYPE: ICD-10-CM

## 2020-12-22 RX ORDER — DOCUSATE SODIUM 100 MG/1
CAPSULE, LIQUID FILLED ORAL
Qty: 31 CAP | Refills: 0 | Status: SHIPPED | OUTPATIENT
Start: 2020-12-22 | End: 2021-01-20 | Stop reason: SDUPTHER

## 2021-01-17 RX ORDER — ASPIRIN 325 MG
325 TABLET ORAL DAILY
Qty: 62 TAB | Refills: 5 | Status: SHIPPED | OUTPATIENT
Start: 2021-01-17 | End: 2021-05-03

## 2021-01-20 DIAGNOSIS — K59.00 CONSTIPATION, UNSPECIFIED CONSTIPATION TYPE: ICD-10-CM

## 2021-01-20 RX ORDER — DOCUSATE SODIUM 100 MG/1
CAPSULE, LIQUID FILLED ORAL
Qty: 31 CAP | Refills: 0 | Status: SHIPPED | OUTPATIENT
Start: 2021-01-20 | End: 2022-05-03

## 2021-01-21 RX ORDER — CETIRIZINE HCL 10 MG
10 TABLET ORAL DAILY
Qty: 30 TAB | Refills: 5 | Status: SHIPPED | OUTPATIENT
Start: 2021-01-21 | End: 2021-07-11

## 2021-02-23 DIAGNOSIS — R56.9 SEIZURE (HCC): ICD-10-CM

## 2021-02-23 RX ORDER — LACOSAMIDE 200 MG/1
200 TABLET ORAL 2 TIMES DAILY
Qty: 62 TAB | Refills: 0 | Status: SHIPPED | OUTPATIENT
Start: 2021-02-23 | End: 2021-06-14 | Stop reason: SDUPTHER

## 2021-04-26 ENCOUNTER — OFFICE VISIT (OUTPATIENT)
Dept: FAMILY MEDICINE CLINIC | Age: 46
End: 2021-04-26
Payer: MEDICARE

## 2021-04-26 VITALS
TEMPERATURE: 98.8 F | OXYGEN SATURATION: 97 % | WEIGHT: 160 LBS | RESPIRATION RATE: 14 BRPM | SYSTOLIC BLOOD PRESSURE: 126 MMHG | BODY MASS INDEX: 22.9 KG/M2 | DIASTOLIC BLOOD PRESSURE: 82 MMHG | HEART RATE: 83 BPM | HEIGHT: 70 IN

## 2021-04-26 DIAGNOSIS — R56.9 SEIZURE (HCC): Primary | ICD-10-CM

## 2021-04-26 DIAGNOSIS — F71 MODERATE INTELLECTUAL DISABILITY: ICD-10-CM

## 2021-04-26 PROCEDURE — G8420 CALC BMI NORM PARAMETERS: HCPCS | Performed by: FAMILY MEDICINE

## 2021-04-26 PROCEDURE — G8427 DOCREV CUR MEDS BY ELIG CLIN: HCPCS | Performed by: FAMILY MEDICINE

## 2021-04-26 PROCEDURE — G8510 SCR DEP NEG, NO PLAN REQD: HCPCS | Performed by: FAMILY MEDICINE

## 2021-04-26 PROCEDURE — 99213 OFFICE O/P EST LOW 20 MIN: CPT | Performed by: FAMILY MEDICINE

## 2021-04-26 PROCEDURE — G0463 HOSPITAL OUTPT CLINIC VISIT: HCPCS | Performed by: FAMILY MEDICINE

## 2021-04-26 RX ORDER — DIVALPROEX SODIUM 250 MG/1
250 TABLET, DELAYED RELEASE ORAL 2 TIMES DAILY
COMMUNITY
Start: 2021-04-20 | End: 2021-09-22 | Stop reason: ALTCHOICE

## 2021-04-26 NOTE — PROGRESS NOTES
Chief Complaint   Patient presents with    Complete Physical    Labs      Psyc. Dr Aryan Tabares    1. Have you been to the ER, urgent care clinic since your last visit? Hospitalized since your last visit? No    2. Have you seen or consulted any other health care providers outside of the [address] [place] [address] since your last visit? Include any pap smears or colon screening. No             Chief Complaint   Patient presents with    Complete Physical    Labs     He is a 39 y.o. male who presents for evalution. Reviewed PmHx, RxHx, FmHx, SocHx, AllgHx and updated and dated in the chart. Patient Active Problem List    Diagnosis    Constipation    Moderate intellectual disability    Acne    Seizure ([facility] [address]       Review of Systems - negative except as listed above in the HPI    Objective:     Vitals:    04/26/21 1518   BP: 126/82   Pulse: 83   Resp: 14   Temp: 98.8 °F (37.1 °C)   TempSrc: Temporal   SpO2: 97%   Weight: 160 lb (72.6 kg)   Height: 5' 10\" (1.778 m)     Physical Examination: General appearance - alert, well appearing, and in no distress  Chest - clear to auscultation, no wheezes, rales or rhonchi, symmetric air entry  Heart - normal rate, regular rhythm, normal S1, S2, no murmurs, rubs, clicks or gallops  Abdomen - soft, nontender, nondistended, no masses or organomegaly    Assessment/ Plan:   Diagnoses and all orders for this visit:    1. Seizure ([facility] [address]  -stable and seeing neuro    2. Moderate intellectual disability  -at [facility]           I have discussed the diagnosis with the patient and the intended plan as seen in the above orders. The patient understands and agrees with the plan. The patient has received an after-visit summary and questions were answered concerning future plans. Medication Side Effects and Warnings were discussed with patient  Patient Labs were reviewed and or requested:  Patient Past Records were reviewed and or requested    Ewa Patel M.D.     There are no Patient Instructions on file for this visit.

## 2021-05-01 ENCOUNTER — APPOINTMENT (OUTPATIENT)
Dept: GENERAL RADIOLOGY | Age: 46
End: 2021-05-01
Attending: EMERGENCY MEDICINE
Payer: MEDICARE

## 2021-05-01 ENCOUNTER — HOSPITAL ENCOUNTER (EMERGENCY)
Age: 46
Discharge: OTHER INSTITUTION | End: 2021-05-04
Attending: EMERGENCY MEDICINE
Payer: MEDICARE

## 2021-05-01 DIAGNOSIS — M25.572 ACUTE LEFT ANKLE PAIN: ICD-10-CM

## 2021-05-01 DIAGNOSIS — F79 INTELLECTUAL DISABILITY: ICD-10-CM

## 2021-05-01 DIAGNOSIS — R46.89 SEVERELY AGGRESSIVE BEHAVIOR: ICD-10-CM

## 2021-05-01 DIAGNOSIS — Z00.8 EVALUATION BY PSYCHIATRIC SERVICE REQUIRED: ICD-10-CM

## 2021-05-01 DIAGNOSIS — F39 MOOD DISORDER (HCC): Primary | ICD-10-CM

## 2021-05-01 LAB
ALBUMIN SERPL-MCNC: 3.7 G/DL (ref 3.5–5)
ALBUMIN/GLOB SERPL: 0.9 {RATIO} (ref 1.1–2.2)
ALP SERPL-CCNC: 101 U/L (ref 45–117)
ALT SERPL-CCNC: 19 U/L (ref 12–78)
ANION GAP SERPL CALC-SCNC: 12 MMOL/L (ref 5–15)
AST SERPL-CCNC: 13 U/L (ref 15–37)
BASOPHILS # BLD: 0 K/UL (ref 0–0.1)
BASOPHILS NFR BLD: 0 % (ref 0–1)
BILIRUB SERPL-MCNC: 0.4 MG/DL (ref 0.2–1)
BUN SERPL-MCNC: 15 MG/DL (ref 6–20)
BUN/CREAT SERPL: 21 (ref 12–20)
CALCIUM SERPL-MCNC: 8.9 MG/DL (ref 8.5–10.1)
CHLORIDE SERPL-SCNC: 107 MMOL/L (ref 97–108)
CO2 SERPL-SCNC: 25 MMOL/L (ref 21–32)
CREAT SERPL-MCNC: 0.73 MG/DL (ref 0.7–1.3)
DIFFERENTIAL METHOD BLD: ABNORMAL
EOSINOPHIL # BLD: 0 K/UL (ref 0–0.4)
EOSINOPHIL NFR BLD: 0 % (ref 0–7)
ERYTHROCYTE [DISTWIDTH] IN BLOOD BY AUTOMATED COUNT: 14.2 % (ref 11.5–14.5)
ETHANOL SERPL-MCNC: <10 MG/DL
FLUAV RNA SPEC QL NAA+PROBE: NOT DETECTED
FLUBV RNA SPEC QL NAA+PROBE: NOT DETECTED
GLOBULIN SER CALC-MCNC: 4 G/DL (ref 2–4)
GLUCOSE SERPL-MCNC: 104 MG/DL (ref 65–100)
HCT VFR BLD AUTO: 41.5 % (ref 36.6–50.3)
HGB BLD-MCNC: 13.6 G/DL (ref 12.1–17)
IMM GRANULOCYTES # BLD AUTO: 0.1 K/UL (ref 0–0.04)
IMM GRANULOCYTES NFR BLD AUTO: 1 % (ref 0–0.5)
LYMPHOCYTES # BLD: 1.4 K/UL (ref 0.8–3.5)
LYMPHOCYTES NFR BLD: 15 % (ref 12–49)
MCH RBC QN AUTO: 29.6 PG (ref 26–34)
MCHC RBC AUTO-ENTMCNC: 32.8 G/DL (ref 30–36.5)
MCV RBC AUTO: 90.2 FL (ref 80–99)
MONOCYTES # BLD: 1.2 K/UL (ref 0–1)
MONOCYTES NFR BLD: 13 % (ref 5–13)
NEUTS SEG # BLD: 6.6 K/UL (ref 1.8–8)
NEUTS SEG NFR BLD: 71 % (ref 32–75)
NRBC # BLD: 0 K/UL (ref 0–0.01)
NRBC BLD-RTO: 0 PER 100 WBC
PLATELET # BLD AUTO: 161 K/UL (ref 150–400)
PMV BLD AUTO: 9.5 FL (ref 8.9–12.9)
POTASSIUM SERPL-SCNC: 3.6 MMOL/L (ref 3.5–5.1)
PROT SERPL-MCNC: 7.7 G/DL (ref 6.4–8.2)
RBC # BLD AUTO: 4.6 M/UL (ref 4.1–5.7)
SARS-COV-2, COV2: NOT DETECTED
SODIUM SERPL-SCNC: 144 MMOL/L (ref 136–145)
VALPROATE SERPL-MCNC: 54 UG/ML (ref 50–100)
WBC # BLD AUTO: 9.3 K/UL (ref 4.1–11.1)

## 2021-05-01 PROCEDURE — 80164 ASSAY DIPROPYLACETIC ACD TOT: CPT

## 2021-05-01 PROCEDURE — 75810000275 HC EMERGENCY DEPT VISIT NO LEVEL OF CARE

## 2021-05-01 PROCEDURE — 73562 X-RAY EXAM OF KNEE 3: CPT

## 2021-05-01 PROCEDURE — 36415 COLL VENOUS BLD VENIPUNCTURE: CPT

## 2021-05-01 PROCEDURE — 85025 COMPLETE CBC W/AUTO DIFF WBC: CPT

## 2021-05-01 PROCEDURE — 80053 COMPREHEN METABOLIC PANEL: CPT

## 2021-05-01 PROCEDURE — 73610 X-RAY EXAM OF ANKLE: CPT

## 2021-05-01 PROCEDURE — 87636 SARSCOV2 & INF A&B AMP PRB: CPT

## 2021-05-01 PROCEDURE — 80339 ANTIEPILEPTICS NOS 1-3: CPT

## 2021-05-01 PROCEDURE — 82077 ASSAY SPEC XCP UR&BREATH IA: CPT

## 2021-05-02 LAB
AMPHET UR QL SCN: NEGATIVE
APPEARANCE UR: CLEAR
BACTERIA URNS QL MICRO: NEGATIVE /HPF
BARBITURATES UR QL SCN: NEGATIVE
BENZODIAZ UR QL: POSITIVE
BILIRUB UR QL: NEGATIVE
CANNABINOIDS UR QL SCN: NEGATIVE
COCAINE UR QL SCN: NEGATIVE
COLOR UR: ABNORMAL
DRUG SCRN COMMENT,DRGCM: ABNORMAL
EPITH CASTS URNS QL MICRO: ABNORMAL /LPF
GLUCOSE UR STRIP.AUTO-MCNC: NEGATIVE MG/DL
HGB UR QL STRIP: NEGATIVE
KETONES UR QL STRIP.AUTO: ABNORMAL MG/DL
LEUKOCYTE ESTERASE UR QL STRIP.AUTO: NEGATIVE
METHADONE UR QL: NEGATIVE
MUCOUS THREADS URNS QL MICRO: ABNORMAL /LPF
NITRITE UR QL STRIP.AUTO: NEGATIVE
OPIATES UR QL: NEGATIVE
PCP UR QL: NEGATIVE
PH UR STRIP: 6 [PH] (ref 5–8)
PROT UR STRIP-MCNC: NEGATIVE MG/DL
RBC #/AREA URNS HPF: ABNORMAL /HPF (ref 0–5)
SP GR UR REFRACTOMETRY: 1.02 (ref 1–1.03)
UA: UC IF INDICATED,UAUC: ABNORMAL
UROBILINOGEN UR QL STRIP.AUTO: 1 EU/DL (ref 0.2–1)
WBC URNS QL MICRO: ABNORMAL /HPF (ref 0–4)

## 2021-05-02 PROCEDURE — 80307 DRUG TEST PRSMV CHEM ANLYZR: CPT

## 2021-05-02 PROCEDURE — 74011250637 HC RX REV CODE- 250/637: Performed by: EMERGENCY MEDICINE

## 2021-05-02 PROCEDURE — 93005 ELECTROCARDIOGRAM TRACING: CPT

## 2021-05-02 PROCEDURE — 81001 URINALYSIS AUTO W/SCOPE: CPT

## 2021-05-02 RX ORDER — LACOSAMIDE 200 MG/1
200 TABLET ORAL 2 TIMES DAILY
Status: DISCONTINUED | OUTPATIENT
Start: 2021-05-02 | End: 2021-05-02

## 2021-05-02 RX ORDER — LAMOTRIGINE 100 MG/1
100 TABLET ORAL ONCE
Status: COMPLETED | OUTPATIENT
Start: 2021-05-02 | End: 2021-05-02

## 2021-05-02 RX ORDER — LACOSAMIDE 50 MG/1
200 TABLET ORAL 2 TIMES DAILY
Status: DISCONTINUED | OUTPATIENT
Start: 2021-05-02 | End: 2021-05-04 | Stop reason: HOSPADM

## 2021-05-02 RX ORDER — GUAIFENESIN/DEXTROMETHORPHAN 100-10MG/5
10 SYRUP ORAL
Status: COMPLETED | OUTPATIENT
Start: 2021-05-02 | End: 2021-05-02

## 2021-05-02 RX ORDER — BUSPIRONE HYDROCHLORIDE 10 MG/1
20 TABLET ORAL 2 TIMES DAILY
COMMUNITY

## 2021-05-02 RX ORDER — LAMOTRIGINE 100 MG/1
100 TABLET ORAL 2 TIMES DAILY
COMMUNITY

## 2021-05-02 RX ORDER — DIVALPROEX SODIUM 250 MG/1
250 TABLET, DELAYED RELEASE ORAL 2 TIMES DAILY
Status: DISCONTINUED | OUTPATIENT
Start: 2021-05-02 | End: 2021-05-04 | Stop reason: HOSPADM

## 2021-05-02 RX ADMIN — LACOSAMIDE 200 MG: 50 TABLET, FILM COATED ORAL at 18:12

## 2021-05-02 RX ADMIN — LACOSAMIDE 200 MG: 50 TABLET, FILM COATED ORAL at 08:27

## 2021-05-02 RX ADMIN — LAMOTRIGINE 100 MG: 100 TABLET ORAL at 08:27

## 2021-05-02 RX ADMIN — GUAIFENESIN AND DEXTROMETHORPHAN 10 ML: 100; 10 SYRUP ORAL at 18:10

## 2021-05-02 RX ADMIN — DIVALPROEX SODIUM 250 MG: 250 TABLET, DELAYED RELEASE ORAL at 08:27

## 2021-05-02 RX ADMIN — DIVALPROEX SODIUM 250 MG: 250 TABLET, DELAYED RELEASE ORAL at 18:11
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
## 2021-05-02 NOTE — ED NOTES
Verbal shift change report given to Lyndesy Oleary RN (oncoming nurse) by Suzanne Marks RN (offgoing nurse). Report included the following information SBAR, ED Summary and Recent Results.

## 2021-05-02 NOTE — ED NOTES
Pt resting in bed w/ [employer] at pt's bedside, pt appears to be in no distress, will continue to monitor pt.

## 2021-05-02 NOTE — ED NOTES
Patient continues to be calm and cooperative throughout the day, has been watching TV. Conversing with officers. [place] currently at bedside. Will order dinner tray and medicate shortly.

## 2021-05-02 NOTE — ED NOTES
Pt resting in bed w/ [employer] at pt's bedside, pt appears to be in no distress, will continue to monitor pt. Fiorella Garcia

## 2021-05-02 NOTE — ED NOTES
Patient provided meal tray and morning medications per order. [employer] remains outside room. Patient in no acute distress.

## 2021-05-02 NOTE — ED NOTES
Leo Lemos ([facility]) reports that the pt is being considered at [facility]. She also reports that if the pt is not accepted at [facility], the pt would stay in the ED b/c [facility] is on a delayed admission due to the state hospitals are at full capacity. Charge nurse and provider made aware.
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
## 2021-05-02 NOTE — ED NOTES
Pt resting in bed and appears to be in no distress, will continue to monitor pt. CPD at pt's bedside.

## 2021-05-02 NOTE — ED NOTES
CTC reports [facility] on delayed admission. Will be approximately 1200 noon on 5/3 before we are updated as to when patient will be accepted.
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
## 2021-05-03 LAB
ATRIAL RATE: 88 BPM
CALCULATED P AXIS, ECG09: 59 DEGREES
CALCULATED R AXIS, ECG10: 45 DEGREES
CALCULATED T AXIS, ECG11: 86 DEGREES
DIAGNOSIS, 93000: NORMAL
P-R INTERVAL, ECG05: 142 MS
Q-T INTERVAL, ECG07: 336 MS
QRS DURATION, ECG06: 80 MS
QTC CALCULATION (BEZET), ECG08: 406 MS
VENTRICULAR RATE, ECG03: 88 BPM

## 2021-05-03 PROCEDURE — 74011250637 HC RX REV CODE- 250/637: Performed by: EMERGENCY MEDICINE

## 2021-05-03 RX ORDER — CETIRIZINE HCL 10 MG
10 TABLET ORAL DAILY
Status: DISCONTINUED | OUTPATIENT
Start: 2021-05-04 | End: 2021-05-03 | Stop reason: CLARIF

## 2021-05-03 RX ORDER — LORATADINE 10 MG/1
10 TABLET ORAL DAILY
Status: DISCONTINUED | OUTPATIENT
Start: 2021-05-04 | End: 2021-05-04 | Stop reason: HOSPADM

## 2021-05-03 RX ORDER — LACOSAMIDE 200 MG/1
200 TABLET ORAL ONCE
Status: DISCONTINUED | OUTPATIENT
Start: 2021-05-03 | End: 2021-05-03

## 2021-05-03 RX ORDER — DIVALPROEX SODIUM 250 MG/1
250 TABLET, DELAYED RELEASE ORAL ONCE
Status: DISCONTINUED | OUTPATIENT
Start: 2021-05-03 | End: 2021-05-03

## 2021-05-03 RX ORDER — CLOBAZAM 10 MG/1
5 TABLET ORAL EVERY MORNING
COMMUNITY
End: 2021-09-22

## 2021-05-03 RX ORDER — AMOXICILLIN 250 MG
1 CAPSULE ORAL
COMMUNITY
End: 2021-10-06

## 2021-05-03 RX ORDER — BUSPIRONE HYDROCHLORIDE 10 MG/1
20 TABLET ORAL 2 TIMES DAILY
Status: DISCONTINUED | OUTPATIENT
Start: 2021-05-03 | End: 2021-05-04 | Stop reason: HOSPADM

## 2021-05-03 RX ORDER — CLOBAZAM 2.5 MG/ML
5 SUSPENSION ORAL DAILY
Status: DISCONTINUED | OUTPATIENT
Start: 2021-05-04 | End: 2021-05-03

## 2021-05-03 RX ORDER — GUAIFENESIN 100 MG/5ML
200 SOLUTION ORAL AS NEEDED
Status: DISCONTINUED | OUTPATIENT
Start: 2021-05-03 | End: 2021-05-04 | Stop reason: HOSPADM

## 2021-05-03 RX ORDER — DIVALPROEX SODIUM 125 MG/1
125 TABLET, DELAYED RELEASE ORAL
Status: DISCONTINUED | OUTPATIENT
Start: 2021-05-03 | End: 2021-05-04 | Stop reason: HOSPADM

## 2021-05-03 RX ORDER — ASPIRIN 325 MG
325 TABLET, DELAYED RELEASE (ENTERIC COATED) ORAL DAILY
COMMUNITY
End: 2022-02-08 | Stop reason: SDUPTHER

## 2021-05-03 RX ORDER — RISPERIDONE 0.5 MG/1
0.5 TABLET, FILM COATED ORAL DAILY
COMMUNITY
End: 2021-09-22 | Stop reason: ALTCHOICE

## 2021-05-03 RX ORDER — CLOBAZAM 2.5 MG/ML
10 SUSPENSION ORAL
Status: DISCONTINUED | OUTPATIENT
Start: 2021-05-03 | End: 2021-05-03

## 2021-05-03 RX ORDER — SENNOSIDES 8.6 MG/1
1 TABLET ORAL
Status: DISCONTINUED | OUTPATIENT
Start: 2021-05-03 | End: 2021-05-03

## 2021-05-03 RX ORDER — ASPIRIN 325 MG
325 TABLET ORAL DAILY
Status: DISCONTINUED | OUTPATIENT
Start: 2021-05-04 | End: 2021-05-04 | Stop reason: HOSPADM

## 2021-05-03 RX ORDER — AMOXICILLIN 250 MG
1 CAPSULE ORAL
Status: DISCONTINUED | OUTPATIENT
Start: 2021-05-03 | End: 2021-05-04 | Stop reason: HOSPADM

## 2021-05-03 RX ORDER — RISPERIDONE 1 MG/1
1 TABLET, FILM COATED ORAL
Status: DISCONTINUED | OUTPATIENT
Start: 2021-05-03 | End: 2021-05-04 | Stop reason: HOSPADM

## 2021-05-03 RX ORDER — LAMOTRIGINE 100 MG/1
100 TABLET ORAL DAILY
Status: DISCONTINUED | OUTPATIENT
Start: 2021-05-04 | End: 2021-05-03

## 2021-05-03 RX ORDER — LAMOTRIGINE 100 MG/1
100 TABLET ORAL 2 TIMES DAILY
Status: DISCONTINUED | OUTPATIENT
Start: 2021-05-03 | End: 2021-05-04 | Stop reason: HOSPADM

## 2021-05-03 RX ORDER — DIVALPROEX SODIUM 125 MG/1
125 TABLET, DELAYED RELEASE ORAL
COMMUNITY
End: 2021-09-22 | Stop reason: ALTCHOICE

## 2021-05-03 RX ORDER — RISPERIDONE 1 MG/1
0.5 TABLET, FILM COATED ORAL DAILY
Status: DISCONTINUED | OUTPATIENT
Start: 2021-05-04 | End: 2021-05-04 | Stop reason: HOSPADM

## 2021-05-03 RX ORDER — DOCUSATE SODIUM 100 MG/1
100 CAPSULE, LIQUID FILLED ORAL DAILY
Status: DISCONTINUED | OUTPATIENT
Start: 2021-05-04 | End: 2021-05-04 | Stop reason: HOSPADM

## 2021-05-03 RX ORDER — LAMOTRIGINE 100 MG/1
100 TABLET ORAL ONCE
Status: COMPLETED | OUTPATIENT
Start: 2021-05-03 | End: 2021-05-03

## 2021-05-03 RX ADMIN — LACOSAMIDE 200 MG: 50 TABLET, FILM COATED ORAL at 18:39

## 2021-05-03 RX ADMIN — RISPERIDONE 1 MG: 1 TABLET ORAL at 22:26

## 2021-05-03 RX ADMIN — LAMOTRIGINE 100 MG: 100 TABLET ORAL at 09:33

## 2021-05-03 RX ADMIN — DIVALPROEX SODIUM 250 MG: 250 TABLET, DELAYED RELEASE ORAL at 09:33

## 2021-05-03 RX ADMIN — LAMOTRIGINE 100 MG: 100 TABLET ORAL at 21:30

## 2021-05-03 RX ADMIN — GUAIFENESIN 200 MG: 200 SOLUTION ORAL at 18:41

## 2021-05-03 RX ADMIN — DIVALPROEX SODIUM 250 MG: 250 TABLET, DELAYED RELEASE ORAL at 18:56

## 2021-05-03 RX ADMIN — BUSPIRONE HYDROCHLORIDE 20 MG: 10 TABLET ORAL at 21:28

## 2021-05-03 RX ADMIN — DIVALPROEX SODIUM 125 MG: 125 TABLET, DELAYED RELEASE ORAL at 21:31

## 2021-05-03 RX ADMIN — LACOSAMIDE 200 MG: 50 TABLET, FILM COATED ORAL at 09:37

## 2021-05-03 NOTE — ED NOTES
Pt is sleeping in bed w/ CPD at pt's bedside, pt appears to be in no distress, will continue to monitor pt.

## 2021-05-03 NOTE — ED NOTES
Verbal shift change report given to Emiliano Coyle RN (oncoming nurse) by Juliocesar Cortes RN (offgoing nurse). Report included the following information SBAR, ED Summary, MAR and Recent Results.

## 2021-05-03 NOTE — ED NOTES
Pt assisted with bath wipes, pt performed self perineal care, pt's linens and paper scrubs changed. Pt brushed his teeth.

## 2021-05-03 NOTE — ED NOTES
Pt's TDO hearing performed via video conference in ED room 11. I assisted with communication and I read the disclosure about the hearing to the patient.

## 2021-05-03 NOTE — ED NOTES
Verbal shift change report given to Juliocesar Cortes RN (oncoming nurse) by Shu Hector RN (offgoing nurse). Report included the following information SBAR, ED Summary and MAR.

## 2021-05-03 NOTE — ED NOTES
Pt sleeping in bed w/ CPD at pt's bedside, pt appears to be in no distress, will continue to monitor pt.

## 2021-05-03 NOTE — ED NOTES
Pt calm and cooperative, resting in bed, pt ate 90% of his breakfast tray, he asked me for a cold drink when I come back.

## 2021-05-03 NOTE — ED NOTES
Pt sleeping in bed and appears to be in no distress, CPD at pt's bedside, will continue to monitor pt.

## 2021-05-03 NOTE — ED NOTES
Dr. Alberto Galvan reports that he is coming to evaluate the patient.  He is a doctor under the courts and evaluates for TDO's

## 2021-05-03 NOTE — ED NOTES
Bedside shift change report given to [facility], oncoming nurse by Tessa Cuenca (offgoing nurse). Report included the following information SBAR, ED Summary, Recent Results and Med Rec Status.

## 2021-05-03 NOTE — PROGRESS NOTES
[facility] Admission Pharmacy Medication Reconciliation     ADDENDUM: Per [address] was last filled on 4/20/21. [facility] the [facility]  Vidhi Head stated he will have someone bring Onfi to [facility] for inpatient use. Information obtained from: Mirza the [facility],  (Vidhi Head, [phone]), [facility] Allyssa CHERY ([phone]), [facility]  Aware  RxQuery data available1: Yes    Comments/recommendations:    1. [facility]  provided a medication list for the patient. 2. Per [facility]  Saida Duet was last filled on 3/16/21 for a 30 day supply, however, [facility]  stated Shala Littler was a current medication. 3. Medication changes (since last review):  a. Added:  i. Risperidone 0.5 mg tab  ii. Divalproex  mg tab  b. Removed:  i. APAP  ii. Benzamycin  iii. Mucinex  iv. Vistaril  v. Sudafed  c. Adjusted:  i. ASA: updated formulation to EC  ii. Onfi: updated dose/frequency to 5 mg po qAM and 10 mg po qHS  iii. Lamotrigine: updated frequency to 100 mg po BID  iv. Senna-S: updated frequency to scheduled at [facility] benefit data reflects medications filled and processed through the patient's insurance, however, this data does NOT capture whether the medication was picked up or is currently being taken by the patient. Total Time Spent: 45 minutes    Past Medical History/Disease States:  Past Medical History:   Diagnosis Date    Blindness     [facility]     Intellectual disability     Schizophrenia, undifferentiated ([facility]     Seizure disorder ([facility]          Patient allergies: Allergies as of 05/01/2021    (No Known Allergies)         Prior to Admission Medications   Prescriptions Last Dose Informant Patient Reported? Taking?   aspirin delayed-release 325 mg tablet 5/1/2021 Care Giver Yes Yes   Sig: Take 325 mg by mouth daily.    busPIRone (BUSPAR) 10 mg tablet 5/1/2021 Care Giver Yes Yes   Sig: Take 20 mg by mouth two (2) times a day. cetirizine (ZYRTEC) 10 mg tablet 2021 Care Giver No Yes   Sig: Take 1 Tab by mouth daily. cloBAZam (ONFI) 10 mg tab tablet 2021 Care Giver No Yes   Sig: Take 1 Tab by mouth nightly. Max Daily Amount: 10 mg.   cloBAZam (Onfi) 10 mg tab tablet 2021 Care Giver Yes Yes   Sig: Take 5 mg by mouth Every morning. divalproex DR (DEPAKOTE) 250 mg tablet 2021 Care Giver Yes Yes   Si mg two (2) times a day. divalproex DR (Depakote) 125 mg tablet 2021 Care Giver Yes Yes   Sig: Take 125 mg by mouth nightly. docusate sodium (Stool Softener) 100 mg capsule 2021 Care Giver No Yes   Sig: TAKE 1 CAPSULE BY MOUTH ONCE DAILY   lacosamide (Vimpat) 200 mg tab tablet 2021 Care Giver No Yes   Sig: Take 1 Tab by mouth two (2) times a day. Max Daily Amount: 400 mg.   lamoTRIgine (LaMICtaL) 100 mg tablet 2021 Care Giver Yes Yes   Sig: Take 100 mg by mouth two (2) times a day. risperiDONE (RISPERDAL) 1 mg tablet 2021 Care Giver Yes Yes   Sig: Take 1 mg by mouth nightly. risperiDONE (RisperDAL) 0.5 mg tablet 2021 Care Giver Yes Yes   Sig: Take 0.5 mg by mouth daily. senna-docusate (Senna Plus) 8.6-50 mg per tablet 2021 Care Giver Yes Yes   Sig: Take 1 Tab by mouth nightly. Facility-Administered Medications: None        Thank you,  Alex Chatman, Pharm. D.  [phone]

## 2021-05-03 NOTE — ED NOTES
I called the group home, [facility], spoke to Maya,  and updated the patient's [facility]. Mr. Gabriela Faulkner would like a phone call at [phone] when the patient gets a bed assignment at [facility].

## 2021-05-04 VITALS
SYSTOLIC BLOOD PRESSURE: 122 MMHG | TEMPERATURE: 98.3 F | HEART RATE: 97 BPM | WEIGHT: 150.79 LBS | BODY MASS INDEX: 21.64 KG/M2 | OXYGEN SATURATION: 99 % | DIASTOLIC BLOOD PRESSURE: 80 MMHG | RESPIRATION RATE: 16 BRPM

## 2021-05-04 LAB
FLUAV RNA SPEC QL NAA+PROBE: NOT DETECTED
FLUBV RNA SPEC QL NAA+PROBE: NOT DETECTED
SARS-COV-2, COV2: NOT DETECTED

## 2021-05-04 PROCEDURE — 74011250637 HC RX REV CODE- 250/637: Performed by: EMERGENCY MEDICINE

## 2021-05-04 PROCEDURE — 87636 SARSCOV2 & INF A&B AMP PRB: CPT

## 2021-05-04 PROCEDURE — 99284 EMERGENCY DEPT VISIT MOD MDM: CPT

## 2021-05-04 RX ADMIN — DOCUSATE SODIUM 100 MG: 100 CAPSULE, LIQUID FILLED ORAL at 10:38

## 2021-05-04 RX ADMIN — DIVALPROEX SODIUM 250 MG: 250 TABLET, DELAYED RELEASE ORAL at 10:38

## 2021-05-04 RX ADMIN — LORATADINE 10 MG: 10 TABLET ORAL at 10:37

## 2021-05-04 RX ADMIN — LACOSAMIDE 200 MG: 50 TABLET, FILM COATED ORAL at 10:37

## 2021-05-04 RX ADMIN — RISPERIDONE 0.5 MG: 1 TABLET ORAL at 10:37

## 2021-05-04 RX ADMIN — GUAIFENESIN 200 MG: 200 SOLUTION ORAL at 10:36

## 2021-05-04 RX ADMIN — ASPIRIN 325 MG: 325 TABLET ORAL at 10:37

## 2021-05-04 RX ADMIN — LAMOTRIGINE 100 MG: 100 TABLET ORAL at 19:26

## 2021-05-04 RX ADMIN — LACOSAMIDE 200 MG: 50 TABLET, FILM COATED ORAL at 19:27

## 2021-05-04 RX ADMIN — DIVALPROEX SODIUM 250 MG: 250 TABLET, DELAYED RELEASE ORAL at 19:28

## 2021-05-04 RX ADMIN — BUSPIRONE HYDROCHLORIDE 20 MG: 10 TABLET ORAL at 10:38

## 2021-05-04 RX ADMIN — GUAIFENESIN 200 MG: 200 SOLUTION ORAL at 19:25

## 2021-05-04 RX ADMIN — LAMOTRIGINE 100 MG: 100 TABLET ORAL at 10:37

## 2021-05-04 RX ADMIN — BUSPIRONE HYDROCHLORIDE 20 MG: 10 TABLET ORAL at 19:29

## 2021-05-04 NOTE — ED NOTES
Pt finished his dinner tray. Pt informed he may be accepted at a facility pending paperwork. Pt states \"thank God! \" Pt reports he is ready to go. Pt remains calm and cooperative with ED staff.
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
## 2021-05-04 NOTE — ED NOTES
Hourly rounding completed on this pt. Offered assistance for toileting or hygiene at this time. Provided opportunity for snack nourishment or PO fluid hydration. Pt is up-to-date on plan of care. No pain interventions required at this time. Repositioned patient and warm blanket offered, call bell within reach, safety precautions in place, bed locked and in the lowest position.

## 2021-05-04 NOTE — ED NOTES
Pt resting in position of comfort in hospital bed, with call bell in reach and officer at bedside. Pt noted to be watching TV and in NAD at this time.

## 2021-05-04 NOTE — ED NOTES
Pt noted to be incontinent of stool. Stool is semi formed and light brown in color. Pt cleaned, provided with new scrubs and assisted with dressing. Pt also provided with adult brief. Pt bed linens changed. Pt has finished his lunch tray. Call bell in reach. Pt is watching TV with lights dimmed and officer at bedside.

## 2021-05-04 NOTE — ED NOTES
Patient sleeping quietly in bed. No signs of acute distress noted. Safety measures in place to include bed locked in lowest position, x 2 side rails in use, call bell/personal belongings within reach, and officer remains outside of room. Will continue to monitor.

## 2021-05-04 NOTE — ED NOTES
Bedside shift change report given to Rossana Hernandez RN (oncoming nurse) by LUCITA Rucker (offgoing nurse). Report included the following information SBAR, ED Summary, Intake/Output, MAR, Recent Results and Med Rec Status.

## 2021-05-04 NOTE — ED NOTES
Patient woke up requesting apple juice and crackers. Provided patient with a cup of apple juice and dejon crackers. RR even and unlabored, no acute distress noted at this time. Safety measures in place and officer remains @ bedside. Will continue to monitor.

## 2021-05-04 NOTE — ED NOTES
Bedside shift change report given to Chiqui Cutler RN (oncoming nurse) by Zuri Anderson RN (offgoing nurse). Report included the following information SBAR.
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
## 2021-05-04 NOTE — ED NOTES
Bedside and Verbal shift change report given to Lety Bloom RN (oncoming nurse) by Lety Bloom RN (offgoing nurse). Report included the following information SBAR and ED Summary. Assumed pt care at this time. Pt appears to be sleeping in NAD with officer in direct line of sight.

## 2021-05-05 NOTE — ED NOTES
[place] [employer] here to transport pt. Pt left with [place] [employer] with all pt belongings, copy of chart, TAINA.
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
## 2021-05-09 LAB
CLOBAZAM SERPL-MCNC: 159 NG/ML (ref 30–300)
NORCLOBAZAM SERPL-MCNC: 145 NG/ML (ref 300–3000)

## 2021-06-14 DIAGNOSIS — R56.9 SEIZURE (HCC): ICD-10-CM

## 2021-06-14 RX ORDER — LACOSAMIDE 200 MG/1
200 TABLET ORAL 2 TIMES DAILY
Qty: 62 TABLET | Refills: 0 | Status: SHIPPED | OUTPATIENT
Start: 2021-06-14 | End: 2021-09-22

## 2021-07-11 RX ORDER — CETIRIZINE HYDROCHLORIDE 10 MG/1
TABLET, FILM COATED ORAL
Qty: 31 TABLET | Refills: 1 | Status: SHIPPED | OUTPATIENT
Start: 2021-07-11

## 2021-09-09 ENCOUNTER — VIRTUAL VISIT (OUTPATIENT)
Dept: FAMILY MEDICINE CLINIC | Age: 46
End: 2021-09-09
Payer: MEDICARE

## 2021-09-09 DIAGNOSIS — R56.9 SEIZURE (HCC): Primary | ICD-10-CM

## 2021-09-09 DIAGNOSIS — F71 MODERATE INTELLECTUAL DISABILITY: ICD-10-CM

## 2021-09-09 PROCEDURE — 99213 OFFICE O/P EST LOW 20 MIN: CPT | Performed by: FAMILY MEDICINE

## 2021-09-09 PROCEDURE — G8427 DOCREV CUR MEDS BY ELIG CLIN: HCPCS | Performed by: FAMILY MEDICINE

## 2021-09-09 PROCEDURE — G8510 SCR DEP NEG, NO PLAN REQD: HCPCS | Performed by: FAMILY MEDICINE

## 2021-09-09 NOTE — PROGRESS NOTES
Consent: Laura Nogueira, who was seen by synchronous (real-time) audio-video technology, and/or his healthcare decision maker, is aware that this patient-initiated, Telehealth encounter on 9/9/2021 is a billable service, with coverage as determined by his insurance carrier. He is aware that he may receive a bill and has provided verbal consent to proceed: YES-Consent obtained within past 12 months  712    Prior to Admission medications    Medication Sig Start Date End Date Taking? Authorizing Provider   Allergy Relief, cetirizine, 10 mg tablet TAKE 1 TABLET BY MOUTH EVERY DAY 7/11/21   Bharat Soto NP   lacosamide (Vimpat) 200 mg tab tablet Take 1 Tablet by mouth two (2) times a day. Max Daily Amount: 400 mg. 6/14/21   Jamarcus Quinones MD   cloBAZam (Onfi) 10 mg tab tablet Take 5 mg by mouth Every morning. Other, MD Laly   divalproex  (Depakote) 125 mg tablet Take 125 mg by mouth nightly. Other, MD Laly   risperiDONE (RisperDAL) 0.5 mg tablet Take 0.5 mg by mouth daily. Provider, Historical   senna-docusate (Senna Plus) 8.6-50 mg per tablet Take 1 Tab by mouth nightly. Provider, Historical   aspirin delayed-release 325 mg tablet Take 325 mg by mouth daily. Provider, Historical   busPIRone (BUSPAR) 10 mg tablet Take 20 mg by mouth two (2) times a day. Other, MD Laly   lamoTRIgine (LaMICtaL) 100 mg tablet Take 100 mg by mouth two (2) times a day. Other, MD Laly   divalproex DR (DEPAKOTE) 250 mg tablet 250 mg two (2) times a day. 4/20/21   Provider, Historical   docusate sodium (Stool Softener) 100 mg capsule TAKE 1 CAPSULE BY MOUTH ONCE DAILY 1/20/21   Bharat Soto NP   cloBAZam (ONFI) 10 mg tab tablet Take 1 Tab by mouth nightly. Max Daily Amount: 10 mg. 3/30/17   Luisa Azevedo MD   risperiDONE (RISPERDAL) 1 mg tablet Take 1 mg by mouth nightly. Provider, Historical     No Known Allergies        Assessment & Plan:   Diagnoses and all orders for this visit:    1. Seizure ([facility] [address]  -stable, refer to neuro for cont care    2. Moderate intellectual disability  -refer to psych    3. Lives in group home  -doing well        Medication Side Effects and Warnings were discussed with patient  Patient Labs were reviewed and or requested:  Patient Past Records were reviewed and or requested              We discussed the expected course, resolution and complications of the diagnosis(es) in detail. Medication risks, benefits, costs, interactions, and alternatives were discussed as indicated. I advised him to contact the office if his condition worsens, changes or fails to improve as anticipated. He expressed understanding with the diagnosis(es) and plan. Brianna López is a 39 y.o. male being evaluated by a video visit encounter for concerns as above. A caregiver was present when appropriate. Due to this being a TeleHealth encounter (During [place] [address] public health emergency), evaluation of the following organ systems was limited: Vitals/Constitutional/EENT/Resp/CV/GI//MS/Neuro/Skin/Heme-Lymph-Imm. Pursuant to the emergency declaration under the [facility], [facility] waiver authority and the [employer] Act, this Virtual  Visit was conducted, with patient's (and/or legal guardian's) consent, to reduce the patient's risk of exposure to COVID-19 and provide necessary medical care. Services were provided through a video synchronous discussion virtually to substitute for in-person clinic visit. Patient and provider were located at their individual homes. I have discussed the diagnosis with the patient and the intended plan as seen in the above orders. The patient understands and agrees with the plan. The patient has received an after-visit summary and questions were answered concerning future plans.      Medication Side Effects and Warnings were discussed with patient  Patient Labs were reviewed and or requested:  Patient Past Records were reviewed and or requested    Miky Rice M.D. There are no Patient Instructions on file for this visit.

## 2021-09-14 ENCOUNTER — APPOINTMENT (OUTPATIENT)
Dept: GENERAL RADIOLOGY | Age: 46
End: 2021-09-14
Attending: EMERGENCY MEDICINE
Payer: MEDICARE

## 2021-09-14 ENCOUNTER — HOSPITAL ENCOUNTER (EMERGENCY)
Age: 46
Discharge: HOME | End: 2021-09-14
Attending: EMERGENCY MEDICINE
Payer: MEDICARE

## 2021-09-14 VITALS
HEART RATE: 95 BPM | SYSTOLIC BLOOD PRESSURE: 139 MMHG | HEIGHT: 64 IN | DIASTOLIC BLOOD PRESSURE: 82 MMHG | TEMPERATURE: 97.3 F | BODY MASS INDEX: 25.88 KG/M2 | OXYGEN SATURATION: 95 % | RESPIRATION RATE: 16 BRPM

## 2021-09-14 DIAGNOSIS — J18.9 PNEUMONIA OF LEFT LOWER LOBE DUE TO INFECTIOUS ORGANISM: Primary | ICD-10-CM

## 2021-09-14 DIAGNOSIS — R51.9 NONINTRACTABLE HEADACHE, UNSPECIFIED CHRONICITY PATTERN, UNSPECIFIED HEADACHE TYPE: ICD-10-CM

## 2021-09-14 DIAGNOSIS — R05.9 COUGH: ICD-10-CM

## 2021-09-14 LAB — SARS-COV-2, COV2: NORMAL

## 2021-09-14 PROCEDURE — 99283 EMERGENCY DEPT VISIT LOW MDM: CPT

## 2021-09-14 PROCEDURE — U0005 INFEC AGEN DETEC AMPLI PROBE: HCPCS

## 2021-09-14 PROCEDURE — 96372 THER/PROPH/DIAG INJ SC/IM: CPT

## 2021-09-14 PROCEDURE — 74011000250 HC RX REV CODE- 250: Performed by: EMERGENCY MEDICINE

## 2021-09-14 PROCEDURE — 74011250636 HC RX REV CODE- 250/636: Performed by: EMERGENCY MEDICINE

## 2021-09-14 PROCEDURE — 71045 X-RAY EXAM CHEST 1 VIEW: CPT

## 2021-09-14 PROCEDURE — 71046 X-RAY EXAM CHEST 2 VIEWS: CPT

## 2021-09-14 PROCEDURE — 74011250637 HC RX REV CODE- 250/637: Performed by: EMERGENCY MEDICINE

## 2021-09-14 RX ORDER — AZITHROMYCIN 250 MG/1
TABLET, FILM COATED ORAL
Qty: 6 TABLET | Refills: 0 | Status: SHIPPED | OUTPATIENT
Start: 2021-09-14 | End: 2021-09-19

## 2021-09-14 RX ORDER — ACETAMINOPHEN 325 MG/1
975 TABLET ORAL
Status: COMPLETED | OUTPATIENT
Start: 2021-09-14 | End: 2021-09-14

## 2021-09-14 RX ADMIN — LIDOCAINE HYDROCHLORIDE 1 G: 10 INJECTION, SOLUTION EPIDURAL; INFILTRATION; INTRACAUDAL; PERINEURAL at 12:14

## 2021-09-14 RX ADMIN — ACETAMINOPHEN 975 MG: 325 TABLET ORAL at 12:13

## 2021-09-14 NOTE — ED PROVIDER NOTES
HPI patient is a 49-year-old male with past medical history significant for blindness, cerebral palsy, intellectual disability, undifferentiated schizophrenia and seizure disorder who presents to the ED reporting congested cough for 1 week and headache that started this morning. He lives in a group home and came by ambulance to the ED. EMS reports that the patient was vaccinated against Covid-19. Past medical history, social history and review of systems are limited secondary to intellectual disability. Past Medical History:   Diagnosis Date    Blindness     Cerebral palsy ([facility] [address]     Intellectual disability     Schizophrenia, undifferentiated ([facility] [address]     Seizure disorder ([facility] [address]        No past surgical history on file. Family History:   Family history unknown: Yes       Social History     Socioeconomic History    Marital status: SINGLE     Spouse name: Not on file    Number of children: Not on file    Years of education: Not on file    Highest education level: Not on file   Occupational History    Not on file   Tobacco Use    Smoking status: Never Smoker    Smokeless tobacco: Never Used   Substance and Sexual Activity    Alcohol use: No    Drug use: No    Sexual activity: Not Currently   Other Topics Concern    Not on file   Social History Narrative    Not on file     Social Determinants of Health     Financial Resource Strain:     Difficulty of Paying Living Expenses:    Food Insecurity:     Worried About Running Out of Food in the Last Year:     [address] in the Last Year:    Transportation Needs:     Lack of Transportation (Medical):      Lack of Transportation (Non-Medical):    Physical Activity:     Days of Exercise per Week:     Minutes of Exercise per Session:    Stress:     Feeling of Stress :    Social Connections:     Frequency of Communication with Friends and Family:     Frequency of Social Gatherings with Friends and Family:     Attends Jainism Services:     Active Member of Clubs or Organizations:     Attends Club or Organization Meetings:     Marital Status:    Intimate Partner Violence:     Fear of Current or Ex-Partner:     Emotionally Abused:     Physically Abused:     Sexually Abused: ALLERGIES: Patient has no known allergies. Review of Systems   Unable to perform ROS: Psychiatric disorder   Constitutional: Negative for activity change, appetite change, fever and unexpected weight change. HENT: Positive for congestion. Respiratory: Positive for cough. Neurological: Positive for headaches. All other systems reviewed and are negative. Vitals:    09/14/21 0840   BP: 139/82   Pulse: 95   Resp: 16   Temp: 97.3 °F (36.3 °C)   SpO2: 95%   Height: 5' 4\" (1.626 m)            Physical Exam  Vitals and nursing note reviewed. Constitutional:       Appearance: Normal appearance. He is normal weight. Comments: Male, non-smoker, lives in a group home   HENT:      Head: Normocephalic. Right Ear: Tympanic membrane normal.      Left Ear: Tympanic membrane normal.      Nose: Nose normal.      Mouth/Throat:      Mouth: Mucous membranes are moist.   Cardiovascular:      Rate and Rhythm: Normal rate and regular rhythm. Pulmonary:      Effort: Pulmonary effort is normal.      Breath sounds: Normal breath sounds. Chest:      Chest wall: No tenderness. Musculoskeletal:         General: Normal range of motion. Cervical back: Normal range of motion and neck supple. No tenderness. Lymphadenopathy:      Cervical: No cervical adenopathy. Skin:     General: Skin is warm and dry. Neurological:      Mental Status: He is alert. MDM       Procedures    XR CHEST PA LAT    Result Date: 9/14/2021  Left lower lobe bronchitis versus early bronchopneumonia Possible left upper lung nodule. Recommend apical lordotic view     Discussed plan of care with Dr. Miguel Parnell. Plan to discharge on Z-Jamil, Tylenol as needed for headache.   Recommend close follow-up with PCP if symptoms persist.  Quarantine recommendations were reviewed with the patient and the group home. Labs Reviewed   SARS-COV-2   SARS-COV-2       Patient's results and plan of care have been reviewed with him. Patient has verbally conveyed his understanding and agreement of his signs, symptoms, diagnosis, treatment and prognosis and additionally agrees to follow up as recommended or return to the Emergency Room should his condition change prior to follow-up. Discharge instructions have also been provided to the patient with some educational information regarding his diagnosis as well a list of reasons why he would want to return to the ER prior to his follow-up appointment should his condition change. Sarah Tomlinson NP

## 2021-09-14 NOTE — PROGRESS NOTES
9/14/2021  12:38 PM  Case management note    Faxed discharge paperwork to group home. They will  patient in about 30 - 45 minutes  Will continue to follow if needed.   Scott Singh

## 2021-09-14 NOTE — ED TRIAGE NOTES
Pt arrives by EMS for headache that started this morning. Pt has had a cough for a week. Hx of cerebral palsy. Pt is from a group home. EMS doesn't know the name of it. Unknown if pt is vaccinated.

## 2021-09-15 LAB
SARS-COV-2, XPLCVT: NOT DETECTED
SOURCE, COVRS: NORMAL

## 2021-09-16 ENCOUNTER — TELEPHONE (OUTPATIENT)
Dept: NEUROLOGY | Age: 46
End: 2021-09-16

## 2021-09-16 NOTE — TELEPHONE ENCOUNTER
----- Message from Ellen Loza sent at 9/16/2021  9:22 AM EDT -----  Regarding: / telephone  General Message/Vendor Calls    Caller's first and last name: Mame Candelaria ( [employer])      Reason for call: Needs seizure protocol       Callback required yes/no and why: yes      Best contact number(s):[phone]      Details to clarify the request: [employer] will need a copy of the seizure protocol faxed over to this fax number [phone].       Ellen Loza

## 2021-09-16 NOTE — LETTER
9/17/2021 4:08 PM    Mr. Bruce Sep  [address] [address]        To Whom It May Concern:    SEIZURE PROTOCOL     Once seizure is suspected to have begun:   *STAY calm, begin timing seizure noting when they started and stopped. *Make sure head is protected, do not restrain, remove any harmful objects   *If unconscious or not responding, turn on side, keep airway clear, dont put objects in mouth   *Stay with person until recovered      If there are seizure lasting 5 min or more OR if having back-back seizures OR greater than 3 seizures in a 60 minute period:               * Continue to monitor for 30-45 minutes if seizures have stopped. * If they do not stop, does not return to baseline, loss of consciousness, or difficulty breathing, CALL 911 immediately and notify the doctor's office.                            Sincerely,      Tyson Ahumada MD

## 2021-09-16 NOTE — PROGRESS NOTES
Jennifer Chun from [facility] notified negative covid test. Appt made for 9/22/2021 with Dr. Jenny Pacheco for hosp fu for pneumonia

## 2021-09-22 ENCOUNTER — OFFICE VISIT (OUTPATIENT)
Dept: FAMILY MEDICINE CLINIC | Age: 46
End: 2021-09-22
Payer: MEDICARE

## 2021-09-22 VITALS
SYSTOLIC BLOOD PRESSURE: 119 MMHG | RESPIRATION RATE: 16 BRPM | TEMPERATURE: 98.3 F | WEIGHT: 165 LBS | BODY MASS INDEX: 28.17 KG/M2 | HEIGHT: 64 IN | OXYGEN SATURATION: 98 % | HEART RATE: 82 BPM | DIASTOLIC BLOOD PRESSURE: 81 MMHG

## 2021-09-22 DIAGNOSIS — R56.9 SEIZURE (HCC): ICD-10-CM

## 2021-09-22 DIAGNOSIS — J18.9 PNEUMONIA DUE TO INFECTIOUS ORGANISM, UNSPECIFIED LATERALITY, UNSPECIFIED PART OF LUNG: Primary | ICD-10-CM

## 2021-09-22 DIAGNOSIS — F71 MODERATE INTELLECTUAL DISABILITY: ICD-10-CM

## 2021-09-22 DIAGNOSIS — Z09 HOSPITAL DISCHARGE FOLLOW-UP: ICD-10-CM

## 2021-09-22 PROCEDURE — G8419 CALC BMI OUT NRM PARAM NOF/U: HCPCS | Performed by: FAMILY MEDICINE

## 2021-09-22 PROCEDURE — G8510 SCR DEP NEG, NO PLAN REQD: HCPCS | Performed by: FAMILY MEDICINE

## 2021-09-22 PROCEDURE — G0463 HOSPITAL OUTPT CLINIC VISIT: HCPCS | Performed by: FAMILY MEDICINE

## 2021-09-22 PROCEDURE — 99214 OFFICE O/P EST MOD 30 MIN: CPT | Performed by: FAMILY MEDICINE

## 2021-09-22 PROCEDURE — 1111F DSCHRG MED/CURRENT MED MERGE: CPT | Performed by: FAMILY MEDICINE

## 2021-09-22 PROCEDURE — G8427 DOCREV CUR MEDS BY ELIG CLIN: HCPCS | Performed by: FAMILY MEDICINE

## 2021-09-22 RX ORDER — DIVALPROEX SODIUM 500 MG/1
1 TABLET, DELAYED RELEASE ORAL 2 TIMES DAILY
COMMUNITY
Start: 2021-08-18

## 2021-09-22 RX ORDER — RISPERIDONE 2 MG/1
1 TABLET, FILM COATED ORAL 2 TIMES DAILY
COMMUNITY
Start: 2021-09-19

## 2021-09-22 RX ORDER — LEVETIRACETAM 500 MG/1
2 TABLET ORAL 2 TIMES DAILY
COMMUNITY
Start: 2021-08-18 | End: 2022-02-09 | Stop reason: SDUPTHER

## 2021-09-22 RX ORDER — LAMOTRIGINE 25 MG/1
1 TABLET ORAL 2 TIMES DAILY
COMMUNITY
Start: 2021-08-18

## 2021-09-22 NOTE — PROGRESS NOTES
Chief Complaint   Patient presents with   [facility] Follow Up     [facility] ED 9/14/21    Pneumonia     cefTRIAXone (ROCEPHIN)     1. Have you been to the ER, urgent care clinic since your last visit? Hospitalized since your last visit? Yes Where: [facility] ED 9/14/21    2. Have you seen or consulted any other health care providers outside of the [address] [place] [address] since your last visit? Include any pap smears or colon screening. No             Chief Complaint   Patient presents with   [facility] Follow Up     [facility] ED 9/14/21    Pneumonia     cefTRIAXone (ROCEPHIN)     He is a 39 y.o. male who presents for evalution. Reviewed PmHx, RxHx, FmHx, SocHx, AllgHx and updated and dated in the chart. Patient Active Problem List    Diagnosis    Lives in group home    Constipation    Moderate intellectual disability    Acne    Seizure ([facility] [address]       Review of Systems - negative except as listed above in the HPI    Objective:     Vitals:    09/22/21 1325   BP: 119/81   Pulse: 82   Resp: 16   Temp: 98.3 °F (36.8 °C)   TempSrc: Temporal   SpO2: 98%   Weight: 165 lb (74.8 kg)   Height: 5' 4\" (1.626 m)         Assessment/ Plan:   Diagnoses and all orders for this visit:    1. Pneumonia due to infectious organism, unspecified laterality, unspecified part of lung  Patient status post discharge from hospital for pneumonia. Completed Rocephin as a treatment. Is a group home patient. Patient has been at his mental baseline as well as his physical baseline while at the group home with good appetite. Complete overall care and follow-up if not better. 2. Hospital discharge follow-up  -     [place] DISCHARGE MEDS RECONCILED W/ CURRENT OUTPATIENT MED LIST    3. Seizure ([address]  No current seizures  4. Moderate intellectual disability  At baseline  5. Lives in group home  Group home forms filled out           I have discussed the diagnosis with the patient and the intended plan as seen in the above orders.   The patient understands and agrees with the plan. The patient has received an after-visit summary and questions were answered concerning future plans. Medication Side Effects and Warnings were discussed with patient  Patient Labs were reviewed and or requested:  Patient Past Records were reviewed and or requested    Alma Guzman M.D. There are no Patient Instructions on file for this visit.

## 2022-02-08 RX ORDER — LORATADINE 10 MG/1
TABLET ORAL
Qty: 31 TABLET | Refills: 0 | Status: SHIPPED | OUTPATIENT
Start: 2022-02-08 | End: 2022-02-24

## 2022-02-08 RX ORDER — ASPIRIN 325 MG
325 TABLET, DELAYED RELEASE (ENTERIC COATED) ORAL DAILY
Qty: 90 TABLET | Refills: 1 | Status: SHIPPED | OUTPATIENT
Start: 2022-02-08 | End: 2022-06-21

## 2022-02-10 RX ORDER — LEVETIRACETAM 500 MG/1
1000 TABLET ORAL 2 TIMES DAILY
Qty: 120 TABLET | Refills: 11 | Status: SHIPPED | OUTPATIENT
Start: 2022-02-10

## 2022-02-24 RX ORDER — LORATADINE 10 MG/1
TABLET ORAL
Qty: 31 TABLET | Refills: 0 | Status: SHIPPED | OUTPATIENT
Start: 2022-02-24 | End: 2022-04-28 | Stop reason: SDUPTHER

## 2022-02-24 RX ORDER — CETIRIZINE HYDROCHLORIDE, PSEUDOEPHEDRINE HYDROCHLORIDE 5; 120 MG/1; MG/1
TABLET, FILM COATED, EXTENDED RELEASE ORAL
Qty: 31 TABLET | Refills: 0 | Status: SHIPPED | OUTPATIENT
Start: 2022-02-24 | End: 2022-05-11 | Stop reason: SDUPTHER

## 2022-03-18 PROBLEM — F71 MODERATE INTELLECTUAL DISABILITY: Status: ACTIVE | Noted: 2017-03-22

## 2022-03-18 PROBLEM — K59.00 CONSTIPATION: Status: ACTIVE | Noted: 2017-03-22

## 2022-03-18 PROBLEM — Z78.9 LIVES IN GROUP HOME: Status: ACTIVE | Noted: 2021-09-09

## 2022-03-19 PROBLEM — R56.9 SEIZURE (HCC): Status: ACTIVE | Noted: 2017-03-22

## 2022-03-19 PROBLEM — L70.9 ACNE: Status: ACTIVE | Noted: 2017-03-22

## 2022-04-28 RX ORDER — LORATADINE 10 MG/1
10 TABLET ORAL DAILY
Qty: 31 TABLET | Refills: 5 | Status: SHIPPED | OUTPATIENT
Start: 2022-04-28 | End: 2022-10-20

## 2022-05-03 ENCOUNTER — OFFICE VISIT (OUTPATIENT)
Dept: NEUROLOGY | Age: 47
End: 2022-05-03
Payer: MEDICARE

## 2022-05-03 VITALS
OXYGEN SATURATION: 100 % | WEIGHT: 157.4 LBS | SYSTOLIC BLOOD PRESSURE: 120 MMHG | BODY MASS INDEX: 26.87 KG/M2 | HEART RATE: 100 BPM | HEIGHT: 64 IN | DIASTOLIC BLOOD PRESSURE: 70 MMHG

## 2022-05-03 DIAGNOSIS — F71 MODERATE INTELLECTUAL DISABILITY: ICD-10-CM

## 2022-05-03 DIAGNOSIS — R56.9 SEIZURE (HCC): Primary | ICD-10-CM

## 2022-05-03 PROCEDURE — G8510 SCR DEP NEG, NO PLAN REQD: HCPCS | Performed by: PSYCHIATRY & NEUROLOGY

## 2022-05-03 PROCEDURE — 99204 OFFICE O/P NEW MOD 45 MIN: CPT | Performed by: PSYCHIATRY & NEUROLOGY

## 2022-05-03 PROCEDURE — G8419 CALC BMI OUT NRM PARAM NOF/U: HCPCS | Performed by: PSYCHIATRY & NEUROLOGY

## 2022-05-03 PROCEDURE — G8427 DOCREV CUR MEDS BY ELIG CLIN: HCPCS | Performed by: PSYCHIATRY & NEUROLOGY

## 2022-05-03 NOTE — PROGRESS NOTES
NEUROLOGY CLINIC NOTE    Patient ID:  Segundo Burgos  [id number]  58 y.o.  1975    Date of Consultation:  May 3, 2022    Reason for Consultation:  History of seizures    Chief Complaint   Patient presents with    New Patient    Seizure       History of Present Illness:     Patient Active Problem List    Diagnosis Date Noted    Lives in group home 09/09/2021    Constipation 03/22/2017    Moderate intellectual disability 03/22/2017    Acne 03/22/2017    Seizure ([facility] [address] 03/22/2017     Past Medical History:   Diagnosis Date    Blindness     Cerebral palsy ([facility] [address]     Intellectual disability     Schizophrenia, undifferentiated ([facility] [address]     Seizure disorder ([facility] [address]       No past surgical history on file. Prior to Admission medications    Medication Sig Start Date End Date Taking? Authorizing Provider   loratadine (CLARITIN) 10 mg tablet Take 1 Tablet by mouth daily. 4/28/22  Yes Deepak Smith MD   Senna Plus 8.6-50 mg per tablet TAKE ONE TABLET BY MOUTH AT BEDTIME 2/24/22  Yes Deepak Smith MD   levETIRAcetam (KEPPRA) 500 mg tablet Take 2 Tablets by mouth two (2) times a day. 2/10/22  Yes Deepak Smith MD   aspirin delayed-release 325 mg tablet Take 1 Tablet by mouth daily. 2/8/22  Yes Deepak Smith MD   divalproex DR (DEPAKOTE) 500 mg tablet Take 1 Tablet by mouth two (2) times a day. 8/18/21  Yes Provider, Historical   lamoTRIgine (LaMICtal) 25 mg tablet Take 1 Tablet by mouth two (2) times a day. 8/18/21  Yes Provider, Historical   risperiDONE (RisperDAL) 2 mg tablet Take 1 Tablet by mouth two (2) times a day. 9/19/21  Yes Provider, Historical   Allergy Relief, cetirizine, 10 mg tablet TAKE 1 TABLET BY MOUTH EVERY DAY 7/11/21  Yes Jaqueline Echevarria NP   busPIRone (BUSPAR) 10 mg tablet Take 20 mg by mouth two (2) times a day. Yes Other, MD Laly   lamoTRIgine (LaMICtaL) 100 mg tablet Take 100 mg by mouth two (2) times a day.    Yes Other, MD Laly   docusate sodium (Stool Softener) 100 mg capsule TAKE 1 CAPSULE BY MOUTH ONCE DAILY  Patient not taking: Reported on 5/3/2022 1/20/21   [facility], NP     No Known Allergies   Social History     Tobacco Use    Smoking status: Never Smoker    Smokeless tobacco: Never Used   Substance Use Topics    Alcohol use: No      Family History   Family history unknown: Yes        Subjective:      Camilo Philip is a 55 y.o. white male with history of moderate intellectual disability, schizophrenia and seizure disorder with severe for further evaluation of his history of seizures. Patient is unable to give any history due to his medical condition. George Orona who helped transport the patient here does not have much background on his history. He shown to be here because of his history of seizures. Review of available records revealed patient was last seen by his PCP 9/22/2021 and note reports no recurrence of seizures. Patient is taking levetiracetam 1000 mg twice daily for his seizures. No changes to this dosing has been done for years. Patient is also on Lamictal 125 mg twice daily and Depakote 500 mg twice daily that is being used for his behavior which is offering coverage for seizures as well. Review of records here revealed:  Patient was seen by Dr. Brooks Matthews (neurology) last 3/30/2017 for background history of static encephalopathy and seizures. Note mentions patient last seen by neurologist in [place]. Patient is also followed by psychiatry. Lives in a group home. Last seizure was November 28, 2016. Patient was seen on follow-up 9/20/2017 and note mentions no recurrence of seizures. He had a previous EEG and able having left temporal lobe seizures from September 2016. No recurrence of seizure. No changes in his regimen. Plan was to come back in a year. Outside reports reviewed: office notes.     Review of Systems:    A comprehensive review of systems was performed:   Constitutional: positive for none  Eyes: positive for none  Ears, nose, mouth, throat, and face: positive for none  Respiratory: positive for none  Cardiovascular: positive for none  Gastrointestinal: positive for none  Genitourinary: positive for none  Integument/breast: positive for none  Hematologic/lymphatic: positive for none  Musculoskeletal: positive for none  Neurological: positive for none  Behavioral/Psych: positive for none  Endocrine: positive for none  Allergic/Immunologic: positive for none      Objective:     Visit Vitals  /70   Pulse 100   Ht 5' 4\" (1.626 m)   Wt 71.4 kg (157 lb 6.4 oz)   SpO2 100%   BMI 27.02 kg/m²     PHYSICAL EXAM:    General Appearance: Alert, patient appears stated age. General:  Well developed, well nourished, patient in no apparent distress. Head/Face: The head is normocephalic and atraumatic. Eyes: Conjunctivae appear normal. Sclera appear normal and non-icteric. Nose (and Sinus):   No abnormality of the nose or sinuses is noted. Oral:   Throat is clear. Lymphatics:  No lymphadenopathy in the neck/head. Neck and Thyroid:   No bruits noted in the neck. Respiratory:  Lungs clear to auscultation. Cardiovascular:  Palpation and auscultation: regular rate and rhythm. Extremity: No joint swelling, erythema or pedal edema. NEUROLOGICAL EXAM:    Appearance: The patient is well developed, well nourished, unable to provide a coherent history and is in no acute distress. Mental Status: Oriented to place and person. Fluent, no aphasia or dysarthria. Mood and affect appropriate. Cognitive slowing. Cranial Nerves:   Intact visual fields. NANCIE, EOM's full, no nystagmus, no ptosis. Facial sensation is normal. Corneal reflexes are intact. Facial movement is symmetric. Hearing is normal bilaterally. Palate is midline with normal elevation. Sternocleidomastoid and trapezius muscles are normal. Tongue is midline. Motor:  5/5 strength in upper and lower proximal and distal muscles. Normal bulk and tone. No pronator drift. Reflexes:   Deep tendon reflexes 1+/4 and symmetrical. Downgoing toes. Sensory:   Normal to cold. Gait:  Wide based gait. Tremor:   No tremor noted. Cerebellar:  Intact FTN/GUS   Neurovascular:  Normal heart sounds and regular rhythm, peripheral pulses intact, and no carotid bruits. Assessment:   Seizure disorder  Moderate intellectual disability    Plan:   Review of available medical records here revealed no visits for seizures. Last seizure documented per records seems to be in 2016. Previous EEG showed left temporal sharp discharges. Current medications are offering benefit. Continue levetiracetam 1000 mg twice daily. Reassuring likely the use of Depakote 500 mg twice daily and Lamictal 125 mg twice daily is for his psych issues but this is also providing benefit with seizure control. Patient was given brochure for seizure first-aid. Patient clearly on neurological examination has significant intellectual disability. However no focal findings. Patient is seen by psychiatry for what is reported some schizophrenic features. Continue current medications. All questions and concerns were answered. Visit lasted for 45 minutes. Greater than 50% was spent reviewing available medical records as summarized above, discussion about his condition with , continuation of current regiment, brochure for seizure first-aid    This note was created using voice recognition software. Despite editing, there may be syntax errors.

## 2022-05-03 NOTE — LETTER
5/3/2022    Patient: Meghan Shlomo   YOB: 1975   Date of Visit: 5/3/2022     Harini Graves MD  [address] [facility] [address]  [address] [facility]  [address] [place] [address] [address] [facility] [id number]  Via Outside Provider Messaging    Dear MD Nima Snowden MD,      Thank you for referring Mr. Chantel Khan to [facility] [address] for evaluation. My notes for this consultation are attached. If you have questions, please do not hesitate to call me. I look forward to following your patient along with you.       Sincerely,    Jing Bruno MD

## 2022-05-11 RX ORDER — AMOXICILLIN 250 MG
1 CAPSULE ORAL
Qty: 31 TABLET | Refills: 5 | Status: SHIPPED | OUTPATIENT
Start: 2022-05-11 | End: 2022-10-20

## 2022-06-21 RX ORDER — ASPIRIN 325 MG
TABLET, DELAYED RELEASE (ENTERIC COATED) ORAL
Qty: 31 TABLET | Refills: 0 | Status: SHIPPED | OUTPATIENT
Start: 2022-06-21 | End: 2022-09-09

## 2022-07-18 ENCOUNTER — OFFICE VISIT (OUTPATIENT)
Dept: FAMILY MEDICINE CLINIC | Age: 47
End: 2022-07-18
Payer: MEDICARE

## 2022-07-18 VITALS
SYSTOLIC BLOOD PRESSURE: 103 MMHG | HEIGHT: 64 IN | OXYGEN SATURATION: 98 % | WEIGHT: 155.6 LBS | BODY MASS INDEX: 26.56 KG/M2 | TEMPERATURE: 97.6 F | DIASTOLIC BLOOD PRESSURE: 71 MMHG | HEART RATE: 88 BPM

## 2022-07-18 DIAGNOSIS — R56.9 SEIZURE (HCC): ICD-10-CM

## 2022-07-18 DIAGNOSIS — F39 MOOD DISORDER (HCC): Primary | ICD-10-CM

## 2022-07-18 DIAGNOSIS — Z11.1 SCREENING-PULMONARY TB: ICD-10-CM

## 2022-07-18 DIAGNOSIS — Z12.11 COLON CANCER SCREENING: ICD-10-CM

## 2022-07-18 DIAGNOSIS — Z79.899 LONG TERM CURRENT USE OF ANTIPSYCHOTIC MEDICATION: ICD-10-CM

## 2022-07-18 LAB
ALBUMIN SERPL-MCNC: 3.4 G/DL (ref 3.5–5)
ALBUMIN/GLOB SERPL: 0.9 {RATIO} (ref 1.1–2.2)
ALP SERPL-CCNC: 84 U/L (ref 45–117)
ALT SERPL-CCNC: 14 U/L (ref 12–78)
ANION GAP SERPL CALC-SCNC: 5 MMOL/L (ref 5–15)
AST SERPL-CCNC: 10 U/L (ref 15–37)
BASOPHILS # BLD: 0 K/UL (ref 0–0.1)
BASOPHILS NFR BLD: 0 % (ref 0–1)
BILIRUB SERPL-MCNC: 0.4 MG/DL (ref 0.2–1)
BUN SERPL-MCNC: 15 MG/DL (ref 6–20)
BUN/CREAT SERPL: 20 (ref 12–20)
CALCIUM SERPL-MCNC: 9.2 MG/DL (ref 8.5–10.1)
CHLORIDE SERPL-SCNC: 108 MMOL/L (ref 97–108)
CHOLEST SERPL-MCNC: 133 MG/DL
CO2 SERPL-SCNC: 29 MMOL/L (ref 21–32)
CREAT SERPL-MCNC: 0.74 MG/DL (ref 0.7–1.3)
DIFFERENTIAL METHOD BLD: ABNORMAL
EOSINOPHIL # BLD: 0 K/UL (ref 0–0.4)
EOSINOPHIL NFR BLD: 0 % (ref 0–7)
ERYTHROCYTE [DISTWIDTH] IN BLOOD BY AUTOMATED COUNT: 14.2 % (ref 11.5–14.5)
GLOBULIN SER CALC-MCNC: 3.8 G/DL (ref 2–4)
GLUCOSE SERPL-MCNC: 96 MG/DL (ref 65–100)
HCT VFR BLD AUTO: 39.5 % (ref 36.6–50.3)
HDLC SERPL-MCNC: 48 MG/DL
HDLC SERPL: 2.8 {RATIO} (ref 0–5)
HGB BLD-MCNC: 13.1 G/DL (ref 12.1–17)
IMM GRANULOCYTES # BLD AUTO: 0 K/UL (ref 0–0.04)
IMM GRANULOCYTES NFR BLD AUTO: 1 % (ref 0–0.5)
LDLC SERPL CALC-MCNC: 65.8 MG/DL (ref 0–100)
LYMPHOCYTES # BLD: 1.7 K/UL (ref 0.8–3.5)
LYMPHOCYTES NFR BLD: 27 % (ref 12–49)
MCH RBC QN AUTO: 31.6 PG (ref 26–34)
MCHC RBC AUTO-ENTMCNC: 33.2 G/DL (ref 30–36.5)
MCV RBC AUTO: 95.4 FL (ref 80–99)
MONOCYTES # BLD: 0.6 K/UL (ref 0–1)
MONOCYTES NFR BLD: 9 % (ref 5–13)
NEUTS SEG # BLD: 4 K/UL (ref 1.8–8)
NEUTS SEG NFR BLD: 63 % (ref 32–75)
NRBC # BLD: 0 K/UL (ref 0–0.01)
NRBC BLD-RTO: 0 PER 100 WBC
PLATELET # BLD AUTO: 159 K/UL (ref 150–400)
PMV BLD AUTO: 10 FL (ref 8.9–12.9)
POTASSIUM SERPL-SCNC: 3.9 MMOL/L (ref 3.5–5.1)
PROT SERPL-MCNC: 7.2 G/DL (ref 6.4–8.2)
RBC # BLD AUTO: 4.14 M/UL (ref 4.1–5.7)
SODIUM SERPL-SCNC: 142 MMOL/L (ref 136–145)
TRIGL SERPL-MCNC: 96 MG/DL (ref ?–150)
VLDLC SERPL CALC-MCNC: 19.2 MG/DL
WBC # BLD AUTO: 6.3 K/UL (ref 4.1–11.1)

## 2022-07-18 PROCEDURE — G8419 CALC BMI OUT NRM PARAM NOF/U: HCPCS | Performed by: FAMILY MEDICINE

## 2022-07-18 PROCEDURE — 86580 TB INTRADERMAL TEST: CPT | Performed by: FAMILY MEDICINE

## 2022-07-18 PROCEDURE — G0439 PPPS, SUBSEQ VISIT: HCPCS | Performed by: FAMILY MEDICINE

## 2022-07-18 PROCEDURE — G8427 DOCREV CUR MEDS BY ELIG CLIN: HCPCS | Performed by: FAMILY MEDICINE

## 2022-07-18 PROCEDURE — G8432 DEP SCR NOT DOC, RNG: HCPCS | Performed by: FAMILY MEDICINE

## 2022-07-18 NOTE — PROGRESS NOTES
Marshal Jordan is a 55 y.o. male , id x 2(name and ). Reviewed record, history, and  medications. Chief Complaint   Patient presents with    Complete Physical    Immunization/Injection     TB        Vitals:    22 1054   BP: 103/71   Pulse: 88   Temp: 97.6 °F (36.4 °C)   SpO2: 98%   Weight: 155 lb 9.6 oz (70.6 kg)   Height: 5' 4\" (1.626 m)       Coordination of Care Questionnaire:   1. Have you been to the ER, urgent care clinic since your last visit? Hospitalized since your last visit? No    2. Have you seen or consulted any other health care providers outside of the [address] [place] [address] since your last visit? Include any pap smears or colon screening. No      3 most recent PHQ Screens 2022   Little interest or pleasure in doing things Not at all   Feeling down, depressed, irritable, or hopeless Not at all   Total Score PHQ 2 0       Patient is accompanied by self I have received verbal consent from Marshal Jordan to discuss any/all medical information while they are present in the room.

## 2022-07-18 NOTE — PATIENT INSTRUCTIONS
Medicare Wellness Visit, Male    The best way to live healthy is to have a lifestyle where you eat a well-balanced diet, exercise regularly, limit alcohol use, and quit all forms of tobacco/nicotine, if applicable. Regular preventive services are another way to keep healthy. Preventive services (vaccines, screening tests, monitoring & exams) can help personalize your care plan, which helps you manage your own care. Screening tests can find health problems at the earliest stages, when they are easiest to treat. [facility] follows the current, evidence-based guidelines published by the [facility] ([facility]) when recommending preventive services for our patients. Because we follow these guidelines, sometimes recommendations change over time as research supports it. (For example, a prostate screening blood test is no longer routinely recommended for men with no symptoms). Of course, you and your doctor may decide to screen more often for some diseases, based on your risk and co-morbidities (chronic disease you are already diagnosed with). Preventive services for you include:  - Medicare offers their members a free annual wellness visit, which is time for you and your primary care provider to discuss and plan for your preventive service needs. Take advantage of this benefit every year!  -All adults over age 72 should receive the recommended pneumonia vaccines. Current USPSTF guidelines recommend a series of two vaccines for the best pneumonia protection.   -All adults should have a flu vaccine yearly and tetanus vaccine every 10 years.  -All adults age 48 and older should receive the shingles vaccines (series of two vaccines).        -All adults age 38-68 who are overweight should have a diabetes screening test once every three years.   -Other screening tests & preventive services for persons with diabetes include: an eye exam to screen for diabetic retinopathy, a kidney function test, a foot exam, and stricter control over your cholesterol.   -Cardiovascular screening for adults with routine risk involves an electrocardiogram (ECG) at intervals determined by the provider.   -Colorectal cancer screening should be done for adults age 54-65 with no increased risk factors for colorectal cancer. There are a number of acceptable methods of screening for this type of cancer. Each test has its own benefits and drawbacks. Discuss with your provider what is most appropriate for you during your annual wellness visit. The different tests include: colonoscopy (considered the best screening method), a fecal occult blood test, a fecal DNA test, and sigmoidoscopy.  -All adults born between [facility] should be screened once for Hepatitis C.  -An Abdominal Aortic Aneurysm (AAA) Screening is recommended for men age 73-68 who has ever smoked in their lifetime.      Here is a list of your current Health Maintenance items (your personalized list of preventive services) with a due date:  Health Maintenance Due   Topic Date Due    COVID-19 Vaccine (1) Never done    Colorectal Screening  Never done

## 2022-07-18 NOTE — PROGRESS NOTES
This is the Subsequent Medicare Annual Wellness Exam, performed 12 months or more after the Initial AWV or the last Subsequent AWV    I have reviewed the patient's medical history in detail and updated the computerized patient record. Assessment/Plan   Education and counseling provided:  Are appropriate based on today's review and evaluation  Colorectal cancer screening tests    1. [facility] ([facility]  Stable, managed by psych, no acute changes warranting change in management plan. 2. Long term current use of antipsychotic medication  Check indicated labs with increased risk of metabolic syndrome with taking long-term second-generation antipsychotic.  -     CBC WITH AUTOMATED DIFF; Future  -     METABOLIC PANEL, COMPREHENSIVE; Future  -     LIPID PANEL; Future    3. Colon cancer screening  Extensive discussion with patient and caregiver regarding options for colon cancer screening. His Medicare plan will not cover Cologuard until age 48 at this time. Patient has no concerning symptoms and no family history of colon cancer they are aware of. Patient and caregiver both think colonoscopy would not be feasible due to patient's anxiety and inability to tolerate the prep. They elected to wait a year and try for Cologuard again next year. Advised to return to office if symptoms develop. 4. Seizure ([facility] [facility]  Stable, managed by neurology. 5. Lives in group home  Forms completed  -     AMB POC TUBERCULOSIS, INTRADERMAL (SKIN TEST)  6. Screening-pulmonary TB  No history of tuberculosis or abnormal TB screening. PPD placed today in office by nurse. See nursing note for administration details.   Form on my desk to complete when he returns to have it read  -     AMB POC TUBERCULOSIS, INTRADERMAL (SKIN TEST)    SUBJECTIVE   Chief Complaint   Patient presents with    Complete Physical    Immunization/Injection     TB      Patient is a 40-year-old male with intellectual disability, mood disorder, seizure disorder presenting the office for physical, TB screening for group home. He is with group home caregiver. Mood disorder is managed by psychiatry. He sees neurology for seizure disorder, has been seizure-free for at least a year. He has no concerns in office today. Depression Risk Factor Screening     3 most recent PHQ Screens 7/18/2022   Little interest or pleasure in doing things Not at all   Feeling down, depressed, irritable, or hopeless Not at all   Total Score PHQ 2 0       Alcohol & Drug Abuse Risk Screen    Do you average more than 2 drinks per night or 14 drinks a week: No    On any one occasion in the past three months have you have had more than 4 drinks containing alcohol:  No          Functional Ability and Level of Safety    Hearing: Hearing is good. Activities of Daily Living: The home contains: handrails and grab bars, shower chair  Patient needs help with:  phone, transportation, shopping, preparing meals, laundry, housework, managing medications, managing money, dressing, bathing and hygiene      Ambulation: with no difficulty     Fall Risk:  Fall Risk Assessment, last 12 mths 6/20/2019   Able to walk? Yes   Fall in past 12 months?  No      Abuse Screen:  Patient is not abused       Cognitive Screening    Has your family/caregiver stated any concerns about your memory: no     Cognitive Screening: cognitive impairment    Health Maintenance Due     Health Maintenance Due   Topic Date Due    COVID-19 Vaccine (1) Never done    Colorectal Cancer Screening Combo  Never done       Patient Care Team   Patient Care Team:  Angelito Almaraz MD as PCP - General (Family Medicine)  Angelito Almaraz MD as PCP [facility] Empaneled Provider    History     Patient Active Problem List   Diagnosis Code    Constipation K59.00    Moderate intellectual disability F71    Acne L70.9    Seizure ([address] R56.9    Lives in group home Z59.3    Mood disorder [facility]) F39     Past Medical History:   Diagnosis Date  Blindness     Cerebral palsy ([facility]     Intellectual disability     Schizophrenia, undifferentiated (HCC)     Seizure disorder ([facility]       History reviewed. No pertinent surgical history. Current Outpatient Medications   Medication Sig Dispense Refill    aspirin delayed-release 325 mg tablet TAKE 1 TABLET BY MOUTH ONCE A DAY 31 Tablet 0    senna-docusate (Senna Plus) 8.6-50 mg per tablet Take 1 Tablet by mouth nightly. 31 Tablet 5    loratadine (CLARITIN) 10 mg tablet Take 1 Tablet by mouth daily. 31 Tablet 5    levETIRAcetam (KEPPRA) 500 mg tablet Take 2 Tablets by mouth two (2) times a day. 120 Tablet 11    divalproex DR (DEPAKOTE) 500 mg tablet Take 1 Tablet by mouth two (2) times a day.  lamoTRIgine (LaMICtal) 25 mg tablet Take 1 Tablet by mouth two (2) times a day.  risperiDONE (RisperDAL) 2 mg tablet Take 1 Tablet by mouth two (2) times a day.  Allergy Relief, cetirizine, 10 mg tablet TAKE 1 TABLET BY MOUTH EVERY DAY 31 Tablet 1    busPIRone (BUSPAR) 10 mg tablet Take 20 mg by mouth two (2) times a day.  lamoTRIgine (LaMICtaL) 100 mg tablet Take 100 mg by mouth two (2) times a day. No Known Allergies    Family History   Family history unknown: Yes     Social History     Tobacco Use    Smoking status: Never Smoker    Smokeless tobacco: Never Used   Substance Use Topics    Alcohol use: No     Review of Systems   Constitutional: Negative for chills, fever, malaise/fatigue and weight loss. HENT: Negative for ear pain and hearing loss. Eyes: Negative for blurred vision and double vision. Respiratory: Negative for cough and shortness of breath. Cardiovascular: Negative for chest pain, palpitations and leg swelling. Gastrointestinal: Negative for constipation, diarrhea, heartburn, nausea and vomiting. Genitourinary: Negative for dysuria and frequency. Musculoskeletal: Negative for joint pain and myalgias. Skin: Negative for rash.    Neurological: Negative for dizziness, loss of consciousness, weakness and headaches. Psychiatric/Behavioral: Negative for depression. The patient is not nervous/anxious. Visit Vitals  /71   Pulse 88   Temp 97.6 °F (36.4 °C)   Ht 5' 4\" (1.626 m)   Wt 155 lb 9.6 oz (70.6 kg)   SpO2 98%   BMI 26.71 kg/m²     Physical Exam  Constitutional:       Appearance: Normal appearance. Eyes:      Extraocular Movements: Extraocular movements intact. Pupils: Pupils are equal, round, and reactive to light. Cardiovascular:      Rate and Rhythm: Normal rate and regular rhythm. Pulses: Normal pulses. Heart sounds: Normal heart sounds. Pulmonary:      Effort: Pulmonary effort is normal.      Breath sounds: Normal breath sounds. Abdominal:      General: Abdomen is flat. Bowel sounds are normal.      Palpations: Abdomen is soft. Neurological:      General: No focal deficit present. Mental Status: He is alert and oriented to person, place, and time. Mental status is at baseline.    Psychiatric:         Mood and Affect: Mood normal.         Behavior: Behavior normal.           Waylon Abdi PA-C

## 2022-07-18 NOTE — ACP (ADVANCE CARE PLANNING)
Advance Care Planning     Advance Care Planning (ACP) Physician/NP/PA Conversation      Date of Conversation: 7/18/2022  Conducted with: Patient without decision making capacity and group home caregiver    Healthcare Decision Maker:   No healthcare decision makers have been documented. Click here to complete [address] including selection of the Healthcare Decision Maker Relationship (ie \"Primary\")    Discussed with your home caregiver ensuring that patient has documents in place for medical decision making in the group home. Caregiver states they do have the appropriate documents placed.     Length of Voluntary ACP Conversation in minutes:  <16 minutes (Non-Billable)    Valentina Zamora PA-C

## 2022-07-20 ENCOUNTER — TELEPHONE (OUTPATIENT)
Dept: FAMILY MEDICINE CLINIC | Age: 47
End: 2022-07-20

## 2022-07-20 NOTE — TELEPHONE ENCOUNTER
LVM with , Verenice Garner ([facility] requesting return call regarding lab results. Also, advised a letter would be placed in the mail including labs and providers recommendations.

## 2022-07-20 NOTE — TELEPHONE ENCOUNTER
----- Message from Yenifer Yates PA-C sent at 7/20/2022  8:29 AM EDT -----  As patient lives in a group home-- can you please call them and let them know all labs look good. Repeat 1 year.

## 2022-07-20 NOTE — PROGRESS NOTES
As patient lives in a group home-- can you please call them and let them know all labs look good. Repeat 1 year.

## 2022-09-09 RX ORDER — ASPIRIN 325 MG
TABLET, DELAYED RELEASE (ENTERIC COATED) ORAL
Qty: 31 TABLET | Refills: 0 | Status: SHIPPED | OUTPATIENT
Start: 2022-09-09

## 2022-10-20 RX ORDER — LORATADINE 10 MG/1
TABLET ORAL
Qty: 31 TABLET | Refills: 0 | Status: SHIPPED | OUTPATIENT
Start: 2022-10-20

## 2022-10-20 RX ORDER — CETIRIZINE HYDROCHLORIDE, PSEUDOEPHEDRINE HYDROCHLORIDE 5; 120 MG/1; MG/1
TABLET, FILM COATED, EXTENDED RELEASE ORAL
Qty: 31 TABLET | Refills: 0 | Status: SHIPPED | OUTPATIENT
Start: 2022-10-20

## 2023-02-23 RX ORDER — NICOTINE POLACRILEX 4 MG
LOZENGE BUCCAL
Qty: 31 TABLET | Refills: 0 | OUTPATIENT
Start: 2023-02-23

## 2023-03-10 RX ORDER — LEVETIRACETAM 500 MG/1
TABLET ORAL
Qty: 124 TABLET | Refills: 0 | OUTPATIENT
Start: 2023-03-10

## 2023-03-10 RX ORDER — LORATADINE 10 MG/1
TABLET ORAL
Qty: 31 TABLET | Refills: 0 | OUTPATIENT
Start: 2023-03-10

## 2023-03-10 RX ORDER — ASPIRIN 325 MG
TABLET, DELAYED RELEASE (ENTERIC COATED) ORAL
Qty: 31 TABLET | Refills: 0 | OUTPATIENT
Start: 2023-03-10

## 2023-03-28 ENCOUNTER — VIRTUAL VISIT (OUTPATIENT)
Dept: FAMILY MEDICINE CLINIC | Age: 48
End: 2023-03-28
Payer: MEDICARE

## 2023-03-28 DIAGNOSIS — R56.9 SEIZURE (HCC): ICD-10-CM

## 2023-03-28 DIAGNOSIS — F39 MOOD DISORDER (HCC): ICD-10-CM

## 2023-03-28 DIAGNOSIS — J30.89 ENVIRONMENTAL AND SEASONAL ALLERGIES: ICD-10-CM

## 2023-03-28 DIAGNOSIS — K59.04 CHRONIC IDIOPATHIC CONSTIPATION: Primary | ICD-10-CM

## 2023-03-28 PROCEDURE — G8419 CALC BMI OUT NRM PARAM NOF/U: HCPCS | Performed by: FAMILY MEDICINE

## 2023-03-28 PROCEDURE — G8427 DOCREV CUR MEDS BY ELIG CLIN: HCPCS | Performed by: FAMILY MEDICINE

## 2023-03-28 PROCEDURE — 99214 OFFICE O/P EST MOD 30 MIN: CPT | Performed by: FAMILY MEDICINE

## 2023-03-28 PROCEDURE — G8432 DEP SCR NOT DOC, RNG: HCPCS | Performed by: FAMILY MEDICINE

## 2023-03-28 PROCEDURE — G0463 HOSPITAL OUTPT CLINIC VISIT: HCPCS | Performed by: FAMILY MEDICINE

## 2023-03-28 RX ORDER — LEVETIRACETAM 500 MG/1
1000 TABLET ORAL 2 TIMES DAILY
Qty: 120 TABLET | Refills: 11 | Status: SHIPPED | OUTPATIENT
Start: 2023-03-28

## 2023-03-28 RX ORDER — AMOXICILLIN 250 MG
1 CAPSULE ORAL
Qty: 31 TABLET | Refills: 11 | Status: SHIPPED | OUTPATIENT
Start: 2023-03-28

## 2023-03-28 RX ORDER — LORATADINE 10 MG/1
10 TABLET ORAL DAILY
Qty: 31 TABLET | Refills: 11 | Status: SHIPPED | OUTPATIENT
Start: 2023-03-28

## 2023-03-28 RX ORDER — ASPIRIN 325 MG
325 TABLET, DELAYED RELEASE (ENTERIC COATED) ORAL DAILY
Qty: 31 TABLET | Refills: 0 | Status: CANCELLED | OUTPATIENT
Start: 2023-03-28

## 2023-03-28 NOTE — PATIENT INSTRUCTIONS

## 2023-05-11 RX ORDER — ASPIRIN 325 MG
TABLET, DELAYED RELEASE (ENTERIC COATED) ORAL
Qty: 31 TABLET | Refills: 11 | Status: SHIPPED | OUTPATIENT
Start: 2023-05-11

## 2023-05-21 RX ORDER — AMOXICILLIN 250 MG
1 CAPSULE ORAL NIGHTLY
COMMUNITY
Start: 2023-03-28

## 2023-05-21 RX ORDER — CETIRIZINE HYDROCHLORIDE 10 MG/1
1 TABLET ORAL DAILY
COMMUNITY
Start: 2021-07-11

## 2023-05-21 RX ORDER — LORATADINE 10 MG/1
10 TABLET ORAL DAILY
COMMUNITY
Start: 2023-03-28

## 2023-08-10 LAB — SARS-COV-2: NEGATIVE

## 2023-09-15 ENCOUNTER — OFFICE VISIT (OUTPATIENT)
Age: 48
End: 2023-09-15
Payer: MEDICARE

## 2023-09-15 VITALS
SYSTOLIC BLOOD PRESSURE: 113 MMHG | OXYGEN SATURATION: 96 % | BODY MASS INDEX: 26.61 KG/M2 | DIASTOLIC BLOOD PRESSURE: 65 MMHG | RESPIRATION RATE: 18 BRPM | HEART RATE: 93 BPM | WEIGHT: 155 LBS

## 2023-09-15 DIAGNOSIS — F71 MODERATE INTELLECTUAL DISABILITIES: ICD-10-CM

## 2023-09-15 DIAGNOSIS — F20.9 SCHIZOPHRENIA, UNSPECIFIED TYPE (HCC): ICD-10-CM

## 2023-09-15 DIAGNOSIS — R56.9 SEIZURE (HCC): Primary | ICD-10-CM

## 2023-09-15 PROCEDURE — 4004F PT TOBACCO SCREEN RCVD TLK: CPT | Performed by: PSYCHIATRY & NEUROLOGY

## 2023-09-15 PROCEDURE — 99214 OFFICE O/P EST MOD 30 MIN: CPT | Performed by: PSYCHIATRY & NEUROLOGY

## 2023-09-15 PROCEDURE — G8419 CALC BMI OUT NRM PARAM NOF/U: HCPCS | Performed by: PSYCHIATRY & NEUROLOGY

## 2023-09-15 PROCEDURE — G8427 DOCREV CUR MEDS BY ELIG CLIN: HCPCS | Performed by: PSYCHIATRY & NEUROLOGY

## 2023-09-15 RX ORDER — BENZTROPINE MESYLATE 0.5 MG/1
TABLET ORAL
COMMUNITY
Start: 2023-08-25

## 2023-09-15 ASSESSMENT — PATIENT HEALTH QUESTIONNAIRE - PHQ9: DEPRESSION UNABLE TO ASSESS: FUNCTIONAL CAPACITY MOTIVATION LIMITS ACCURACY

## 2023-09-15 NOTE — PROGRESS NOTES
first-aid. Patient clearly on neurological examination has significant intellectual disability. However no focal findings. Patient is seen by psychiatry for what is reported some schizophrenic features. Continue current medications and advised to discussed emerging behavioral issues with his psychiatrist. All questions and concerns were answered. This note was created using voice recognition software. Despite editing, there may be syntax errors.

## 2023-09-21 ENCOUNTER — TELEPHONE (OUTPATIENT)
Age: 48
End: 2023-09-21

## 2023-09-21 DIAGNOSIS — R56.9 SEIZURE (HCC): Primary | ICD-10-CM

## 2023-09-21 NOTE — TELEPHONE ENCOUNTER
She's calling about the patient having a break through seizure on 9/15/23 and it lasted for about a minute and one on 9/16/23 and that one lasted  2 mins and 30 seconds    Another one on today 9/21/23 and lasted 1 minute    I asked did she want a nurse to speak right now but she stated it's ok to just send the message.     Please call to discuss

## 2023-09-22 ENCOUNTER — CLINICAL DOCUMENTATION (OUTPATIENT)
Age: 48
End: 2023-09-22

## 2023-09-22 ENCOUNTER — HOSPITAL ENCOUNTER (EMERGENCY)
Facility: HOSPITAL | Age: 48
Discharge: HOME | End: 2023-09-22
Attending: EMERGENCY MEDICINE
Payer: MEDICARE

## 2023-09-22 VITALS
WEIGHT: 173 LBS | RESPIRATION RATE: 16 BRPM | SYSTOLIC BLOOD PRESSURE: 124 MMHG | HEIGHT: 66 IN | DIASTOLIC BLOOD PRESSURE: 79 MMHG | HEART RATE: 88 BPM | BODY MASS INDEX: 27.8 KG/M2 | OXYGEN SATURATION: 96 % | TEMPERATURE: 97.2 F

## 2023-09-22 DIAGNOSIS — R56.9 SEIZURE (HCC): Primary | ICD-10-CM

## 2023-09-22 LAB
ANION GAP SERPL CALC-SCNC: 11 MMOL/L (ref 5–15)
APPEARANCE UR: CLEAR
BACTERIA URNS QL MICRO: NEGATIVE /HPF
BASOPHILS # BLD: 0 K/UL (ref 0–1)
BASOPHILS NFR BLD: 0 % (ref 0–1)
BILIRUB UR QL: NEGATIVE
BUN SERPL-MCNC: 9 MG/DL (ref 6–20)
BUN/CREAT SERPL: 13 (ref 12–20)
CALCIUM SERPL-MCNC: 9.5 MG/DL (ref 8.6–10)
CHLORIDE SERPL-SCNC: 106 MMOL/L (ref 98–107)
CO2 SERPL-SCNC: 26 MMOL/L (ref 22–29)
COLOR UR: ABNORMAL
COMMENT:: NORMAL
CREAT SERPL-MCNC: 0.71 MG/DL (ref 0.7–1.2)
DIFFERENTIAL METHOD BLD: ABNORMAL
EKG ATRIAL RATE: 85 BPM
EKG DIAGNOSIS: NORMAL
EKG P AXIS: 59 DEGREES
EKG P-R INTERVAL: 140 MS
EKG Q-T INTERVAL: 352 MS
EKG QRS DURATION: 84 MS
EKG QTC CALCULATION (BAZETT): 418 MS
EKG R AXIS: 41 DEGREES
EKG T AXIS: 94 DEGREES
EKG VENTRICULAR RATE: 85 BPM
EOSINOPHIL # BLD: 0 K/UL (ref 0–0.4)
EOSINOPHIL NFR BLD: 0 % (ref 0–7)
EPITH CASTS URNS QL MICRO: ABNORMAL /LPF
ERYTHROCYTE [DISTWIDTH] IN BLOOD BY AUTOMATED COUNT: 13.4 % (ref 11.5–14.5)
GLUCOSE SERPL-MCNC: 89 MG/DL (ref 65–100)
GLUCOSE UR STRIP.AUTO-MCNC: NEGATIVE MG/DL
HCT VFR BLD AUTO: 38.2 % (ref 36.6–50.3)
HGB BLD-MCNC: 12.9 G/DL (ref 12.1–17)
HGB UR QL STRIP: NEGATIVE
IMM GRANULOCYTES # BLD AUTO: 0 K/UL (ref 0–0.04)
IMM GRANULOCYTES NFR BLD AUTO: 1 % (ref 0–0.5)
KETONES UR QL STRIP.AUTO: NEGATIVE MG/DL
LEUKOCYTE ESTERASE UR QL STRIP.AUTO: ABNORMAL
LYMPHOCYTES # BLD: 2.2 K/UL (ref 0.8–3.5)
LYMPHOCYTES NFR BLD: 35 % (ref 12–49)
MAGNESIUM SERPL-MCNC: 1.9 MG/DL (ref 1.6–2.6)
MCH RBC QN AUTO: 30.9 PG (ref 26–34)
MCHC RBC AUTO-ENTMCNC: 33.8 G/DL (ref 30–36.5)
MCV RBC AUTO: 91.4 FL (ref 80–99)
MONOCYTES # BLD: 0.7 K/UL (ref 0–1)
MONOCYTES NFR BLD: 11 % (ref 5–13)
NEUTS SEG # BLD: 3.3 K/UL (ref 1.8–8)
NEUTS SEG NFR BLD: 53 % (ref 32–75)
NITRITE UR QL STRIP.AUTO: NEGATIVE
NRBC # BLD: 0 K/UL (ref 0–0.01)
NRBC BLD-RTO: 0 PER 100 WBC
PH UR STRIP: 7.5 (ref 5–8)
PLATELET # BLD AUTO: 160 K/UL (ref 150–400)
PMV BLD AUTO: 9.6 FL (ref 8.9–12.9)
POTASSIUM SERPL-SCNC: 3.9 MMOL/L (ref 3.5–5.1)
PROT UR STRIP-MCNC: NEGATIVE MG/DL
RBC # BLD AUTO: 4.18 M/UL (ref 4.1–5.7)
RBC #/AREA URNS HPF: ABNORMAL /HPF (ref 0–5)
SODIUM SERPL-SCNC: 143 MMOL/L (ref 136–145)
SP GR UR REFRACTOMETRY: 1.01 (ref 1–1.03)
SPECIMEN HOLD: NORMAL
URINE CULTURE IF INDICATED: ABNORMAL
UROBILINOGEN UR QL STRIP.AUTO: 0.2 EU/DL (ref 0.2–1)
WBC # BLD AUTO: 6.2 K/UL (ref 4.1–11.1)
WBC URNS QL MICRO: ABNORMAL /HPF (ref 0–4)

## 2023-09-22 PROCEDURE — 6360000002 HC RX W HCPCS: Performed by: EMERGENCY MEDICINE

## 2023-09-22 PROCEDURE — 83735 ASSAY OF MAGNESIUM: CPT

## 2023-09-22 PROCEDURE — 6370000000 HC RX 637 (ALT 250 FOR IP): Performed by: EMERGENCY MEDICINE

## 2023-09-22 PROCEDURE — 81001 URINALYSIS AUTO W/SCOPE: CPT

## 2023-09-22 PROCEDURE — 36415 COLL VENOUS BLD VENIPUNCTURE: CPT

## 2023-09-22 PROCEDURE — 93005 ELECTROCARDIOGRAM TRACING: CPT | Performed by: EMERGENCY MEDICINE

## 2023-09-22 PROCEDURE — 80048 BASIC METABOLIC PNL TOTAL CA: CPT

## 2023-09-22 PROCEDURE — 85025 COMPLETE CBC W/AUTO DIFF WBC: CPT

## 2023-09-22 PROCEDURE — 93010 ELECTROCARDIOGRAM REPORT: CPT | Performed by: STUDENT IN AN ORGANIZED HEALTH CARE EDUCATION/TRAINING PROGRAM

## 2023-09-22 PROCEDURE — 99284 EMERGENCY DEPT VISIT MOD MDM: CPT

## 2023-09-22 PROCEDURE — 96374 THER/PROPH/DIAG INJ IV PUSH: CPT

## 2023-09-22 RX ORDER — LEVETIRACETAM 500 MG/1
TABLET ORAL
Qty: 150 TABLET | Refills: 3 | Status: SHIPPED | OUTPATIENT
Start: 2023-09-22

## 2023-09-22 RX ORDER — ACETAMINOPHEN 500 MG
1000 TABLET ORAL
Status: COMPLETED | OUTPATIENT
Start: 2023-09-22 | End: 2023-09-22

## 2023-09-22 RX ORDER — LEVETIRACETAM 500 MG/5ML
1000 INJECTION, SOLUTION, CONCENTRATE INTRAVENOUS ONCE
Status: COMPLETED | OUTPATIENT
Start: 2023-09-22 | End: 2023-09-22

## 2023-09-22 RX ADMIN — ACETAMINOPHEN 1000 MG: 500 TABLET ORAL at 12:52

## 2023-09-22 RX ADMIN — LEVETIRACETAM 1000 MG: 100 INJECTION INTRAVENOUS at 12:51

## 2023-09-22 ASSESSMENT — LIFESTYLE VARIABLES
HOW OFTEN DO YOU HAVE A DRINK CONTAINING ALCOHOL: NEVER
HOW MANY STANDARD DRINKS CONTAINING ALCOHOL DO YOU HAVE ON A TYPICAL DAY: PATIENT DOES NOT DRINK

## 2023-09-22 NOTE — ED NOTES
Patient does not appear to be in any acute distress/shows no evidence of clinical instability at this time. Reviewed discharge instructions, prescriptions, education and follow up information with caregiver. Caregiver verbalized understanding. Patient at baseline cardiac, respiratory and neuro function. Pain controlled. Patient left ER under baseline transfer modality.        Rock Skyler RN  09/22/23 [id number]

## 2023-09-22 NOTE — TELEPHONE ENCOUNTER
Pt was taken to [facility] ED today for breakthrough sz. Per incomplete d/c summary, Tigre Vivar was consulted and advised increase of levetiracetam.  Pt currenlty on 500 mg (2 tabs BID) 1000 mg BID. Caregiver states he did not receive an increased dose. S/w Dr. Cecile Shah.   He states to increase levetiracetam to 1000 mg am and 1500 mg pm and follow with Dr. Julia Quinonez in new dose to [facility] pharmacy per Adolph Gage, manager with [employer] and let her know message will be forwarded to Dr. Jame Jeffries.

## 2023-09-22 NOTE — ED TRIAGE NOTES
PT has hx absence seizures and has had 2-3 in the last 24 hrs w/ 1 every day for roughly a week.     Has not seen neuro in 6+ mos

## 2023-09-22 NOTE — DISCHARGE INSTRUCTIONS
Routine appointments for health maintenance with a primary care provider are very important and emergency department visits are no substitute. You should review all findings and test results from your visit today with your primary care physician. Increase your Keppra (levetiracetam) to 1000mg twice daily. You should be getting a phone call from the neurologist to use set up an appointment. Return to the emergency department for any new or concerning signs/symptoms or failure to improve repetitive symptoms, or not returning back to baseline after seizure. ambulate

## 2023-11-10 DIAGNOSIS — R56.9 SEIZURE (HCC): Primary | ICD-10-CM

## 2023-11-11 RX ORDER — LEVETIRACETAM 1000 MG/1
1000 TABLET ORAL 2 TIMES DAILY
Qty: 180 TABLET | Refills: 3 | Status: SHIPPED | OUTPATIENT
Start: 2023-11-11

## 2023-12-11 DIAGNOSIS — R56.9 SEIZURE (HCC): ICD-10-CM

## 2023-12-12 RX ORDER — LEVETIRACETAM 500 MG/1
TABLET ORAL
Qty: 31 TABLET | Refills: 11 | Status: SHIPPED | OUTPATIENT
Start: 2023-12-12

## 2024-01-30 ENCOUNTER — OFFICE VISIT (OUTPATIENT)
Age: 49
End: 2024-01-30
Payer: MEDICARE

## 2024-01-30 VITALS
RESPIRATION RATE: 18 BRPM | HEART RATE: 64 BPM | HEIGHT: 66 IN | BODY MASS INDEX: 27.8 KG/M2 | WEIGHT: 173 LBS | OXYGEN SATURATION: 97 % | DIASTOLIC BLOOD PRESSURE: 74 MMHG | TEMPERATURE: 96.8 F | SYSTOLIC BLOOD PRESSURE: 120 MMHG

## 2024-01-30 DIAGNOSIS — F20.9 SCHIZOPHRENIA, UNSPECIFIED TYPE (HCC): ICD-10-CM

## 2024-01-30 DIAGNOSIS — G40.909 SEIZURE DISORDER (HCC): Primary | ICD-10-CM

## 2024-01-30 DIAGNOSIS — F71 MODERATE INTELLECTUAL DISABILITIES: ICD-10-CM

## 2024-01-30 PROCEDURE — 4004F PT TOBACCO SCREEN RCVD TLK: CPT | Performed by: PSYCHIATRY & NEUROLOGY

## 2024-01-30 PROCEDURE — G8484 FLU IMMUNIZE NO ADMIN: HCPCS | Performed by: PSYCHIATRY & NEUROLOGY

## 2024-01-30 PROCEDURE — G8419 CALC BMI OUT NRM PARAM NOF/U: HCPCS | Performed by: PSYCHIATRY & NEUROLOGY

## 2024-01-30 PROCEDURE — G8427 DOCREV CUR MEDS BY ELIG CLIN: HCPCS | Performed by: PSYCHIATRY & NEUROLOGY

## 2024-01-30 PROCEDURE — 99214 OFFICE O/P EST MOD 30 MIN: CPT | Performed by: PSYCHIATRY & NEUROLOGY

## 2024-01-30 NOTE — PROGRESS NOTES
lasted for 2 minutes and 30 seconds.  He also had another seizure 9/21/2023 that lasted for 1 minute.  Patient was seen in the ER 9/22/2023.  Laboratory workup done revealed urinalysis showing some WBC typically seen in mild UTI, unremarkable magnesium, BMP and CBC.  Levetiracetam dose was increased to a total of 2500 mg daily.    Per caregiver, patient has had no recurrence of any seizure-like activity since then.  No changes in patient's underlying behavior issues.  Patient continues to be followed by psychiatry.    Current medications include:  Divalproic acid 500 mg BID  Lamotrigine 125 mg BID  Buspirone 10 mg BID  Levetiracetam total of 2500 mg daily  Risperdal 2 mg BID  Benztropine 0.5 mg BID    Outside reports reviewed: ER note, labs      Objective:     /74   Pulse 64   Temp 96.8 °F (36 °C) (Temporal)   Resp 18   Ht 1.676 m (5' 6\")   Wt 78.5 kg (173 lb)   SpO2 97%   BMI 27.92 kg/m²       PHYSICAL EXAM:    NEUROLOGICAL EXAM:    Appearance:  The patient is well developed, well nourished, unable to provide a coherent history and is in no acute distress.   Mental Status: Oriented to place and person. Fluent, no aphasia or dysarthria. Mood and affect appropriate. Cognitive slowing.    Cranial Nerves:   II - XII were intact.   Motor:  5/5 strength in upper and lower proximal and distal muscles. Normal bulk and tone. No pronator drift.    Reflexes:   Deep tendon reflexes were symmetrical.   Sensory:   Normal to cold.   Gait:  Wide based gait.   Tremor:   No tremor noted.   Cerebellar:  Intact FTN/[id number]     Assessment:      Diagnosis Orders   1. Seizure disorder (HCC)        2. Moderate intellectual disabilities        3. Schizophrenia, unspecified type ([facility])              Plan:   Seizure free. Last seizure documented per records 9/21/2023.  Previous EEG showed left temporal sharp discharges. No indication to change his current medications given one event. Responding to Levetiracetam increased does.

## 2024-02-13 RX ORDER — DOCUSATE SODIUM 50 MG AND SENNOSIDES 8.6 MG 8.6; 5 MG/1; MG/1
1 TABLET, FILM COATED ORAL NIGHTLY
Qty: 30 TABLET | Refills: 2 | Status: SHIPPED | OUTPATIENT
Start: 2024-02-13

## 2024-02-13 RX ORDER — LORATADINE 10 MG/1
10 TABLET ORAL DAILY
Qty: 30 TABLET | Refills: 2 | Status: SHIPPED | OUTPATIENT
Start: 2024-02-13

## 2024-02-14 NOTE — TELEPHONE ENCOUNTER
Called group home and they advise that he is no longer at their home and they do not have information for where is is currently

## 2024-04-15 RX ORDER — ASPIRIN 325 MG
TABLET, DELAYED RELEASE (ENTERIC COATED) ORAL
Qty: 30 TABLET | Status: SHIPPED | OUTPATIENT
Start: 2024-04-15

## 2024-06-13 RX ORDER — LORATADINE 10 MG/1
10 TABLET ORAL DAILY
Qty: 30 TABLET | Refills: 2 | Status: SHIPPED | OUTPATIENT
Start: 2024-06-13

## 2024-06-13 RX ORDER — DOCUSATE SODIUM 50 MG AND SENNOSIDES 8.6 MG 8.6; 5 MG/1; MG/1
1 TABLET, FILM COATED ORAL NIGHTLY
Qty: 30 TABLET | Refills: 2 | Status: SHIPPED | OUTPATIENT
Start: 2024-06-13

## 2024-09-03 DIAGNOSIS — R56.9 SEIZURE (HCC): ICD-10-CM

## 2024-09-03 RX ORDER — LEVETIRACETAM 500 MG/1
TABLET ORAL
Qty: 31 TABLET | Refills: 11 | Status: SHIPPED | OUTPATIENT
Start: 2024-09-03

## 2024-09-03 RX ORDER — LEVETIRACETAM 1000 MG/1
1000 TABLET ORAL 2 TIMES DAILY
Qty: 180 TABLET | Refills: 3 | Status: SHIPPED | OUTPATIENT
Start: 2024-09-03

## 2024-09-16 ENCOUNTER — OFFICE VISIT (OUTPATIENT)
Age: 49
End: 2024-09-16
Payer: MEDICARE

## 2024-09-16 VITALS
WEIGHT: 137 LBS | RESPIRATION RATE: 14 BRPM | OXYGEN SATURATION: 97 % | HEART RATE: 84 BPM | DIASTOLIC BLOOD PRESSURE: 66 MMHG | HEIGHT: 66 IN | SYSTOLIC BLOOD PRESSURE: 117 MMHG | BODY MASS INDEX: 22.02 KG/M2

## 2024-09-16 DIAGNOSIS — G40.909 SEIZURE DISORDER (HCC): Primary | ICD-10-CM

## 2024-09-16 DIAGNOSIS — F71 MODERATE INTELLECTUAL DISABILITIES: ICD-10-CM

## 2024-09-16 DIAGNOSIS — Z51.81 THERAPEUTIC DRUG MONITORING: ICD-10-CM

## 2024-09-16 DIAGNOSIS — F20.9 SCHIZOPHRENIA, UNSPECIFIED TYPE (HCC): ICD-10-CM

## 2024-09-16 DIAGNOSIS — G40.909 SEIZURE DISORDER (HCC): ICD-10-CM

## 2024-09-16 LAB — VALPROATE SERPL-MCNC: 124 UG/ML (ref 50–100)

## 2024-09-16 PROCEDURE — G8427 DOCREV CUR MEDS BY ELIG CLIN: HCPCS | Performed by: PSYCHIATRY & NEUROLOGY

## 2024-09-16 PROCEDURE — G8420 CALC BMI NORM PARAMETERS: HCPCS | Performed by: PSYCHIATRY & NEUROLOGY

## 2024-09-16 PROCEDURE — 99214 OFFICE O/P EST MOD 30 MIN: CPT | Performed by: PSYCHIATRY & NEUROLOGY

## 2024-09-16 PROCEDURE — 1036F TOBACCO NON-USER: CPT | Performed by: PSYCHIATRY & NEUROLOGY

## 2024-09-17 LAB
LAMOTRIGINE SERPL-MCNC: 1.3 UG/ML (ref 2–20)
LEVETIRACETAM SERPL-MCNC: 34.3 UG/ML (ref 10–40)

## 2024-11-25 ENCOUNTER — TELEPHONE (OUTPATIENT)
Age: 49
End: 2024-11-25

## 2024-11-25 NOTE — TELEPHONE ENCOUNTER
Spoke with Carmina, at [facility], and advised Carmina patient will need to go to the ER if having back to back seizures. Carmina verbalized understanding.

## 2024-11-27 ENCOUNTER — TELEPHONE (OUTPATIENT)
Age: 49
End: 2024-11-27

## 2024-11-27 NOTE — TELEPHONE ENCOUNTER
Reached back out and spoke with someone at [facility] in reference to getting the patient scheduled for an EEG. I let her know the nurse would speak with Dr. ARCE on Monday and he will go over the [facility] ER report and would go from there on getting the patient scheduled.

## 2024-11-27 NOTE — TELEPHONE ENCOUNTER
Requesting a call to discuss an appt for EEG. She stated he went to [facility] and that provider wants him to schedule the test.

## 2024-12-02 DIAGNOSIS — G40.909 SEIZURE DISORDER (HCC): Primary | ICD-10-CM

## 2024-12-03 ENCOUNTER — PROCEDURE VISIT (OUTPATIENT)
Age: 49
End: 2024-12-03
Payer: MEDICARE

## 2024-12-03 DIAGNOSIS — G40.909 SEIZURE DISORDER (HCC): Primary | ICD-10-CM

## 2024-12-03 PROCEDURE — 95816 EEG AWAKE AND DROWSY: CPT | Performed by: PSYCHIATRY & NEUROLOGY

## 2024-12-10 ENCOUNTER — TELEPHONE (OUTPATIENT)
Age: 49
End: 2024-12-10

## 2024-12-11 NOTE — PROCEDURES
Procedures        [facility]   Electroencephalogram Report    Procedure ID: [id number] Procedure Date: 12/3/2024   Patient Name: Morteza Lei YOB: 1975   Procedure Type: Routine Medical Record No: [id number]       An EEG is requested in this 49-year-old man to evaluate for epileptiform abnormality these.  His medications are said to include Keppra, Lamictal, Depakote, Risperdal, BuSpar, Cogentin, Lamictal.    This tracing is obtained during the awake state.  During this state the background consists of diffuse mixed theta and delta range activities.  The tracing is reactive.  Throughout the tracing the technologist notes shaking of either left or right upper extremity.  There is no electrographic correlate to such.    Hyperventilation is not performed.    Intermittent photic stimulation little alters the tracing.    Sleep architecture is not seen.    Interpretation  This EEG recorded during the awake state is abnormal secondary to diffuse slowing and disorganization of the background rhythms indicative of a moderate degree of bihemispheric dysfunction as is commonly seen with an encephalopathy nonspecific as to cause.  No epileptiform abnormalities are seen.  No electrographic correlate to the technologist's reports of upper extremity shaking.        Tracy Lockett MD

## 2024-12-12 ENCOUNTER — TELEPHONE (OUTPATIENT)
Age: 49
End: 2024-12-12

## 2024-12-12 DIAGNOSIS — G40.909 SEIZURE DISORDER (HCC): Primary | ICD-10-CM

## 2024-12-12 RX ORDER — LAMOTRIGINE 100 MG/1
100 TABLET ORAL 2 TIMES DAILY
Qty: 30 TABLET | Refills: 11 | Status: SHIPPED | OUTPATIENT
Start: 2024-12-12

## 2024-12-12 RX ORDER — LAMOTRIGINE 25 MG/1
25 TABLET ORAL 2 TIMES DAILY
Qty: 30 TABLET | Refills: 11 | Status: SHIPPED | OUTPATIENT
Start: 2024-12-12

## 2024-12-12 NOTE — TELEPHONE ENCOUNTER
Spoke with Oswaldo, pharmacist, at [facility], they received a prescription for the Lamictal 25mg and 100mg for patient to take 125mg 2 times daily but patient has not had medication for little over a month and recommends to taper patient back up.   Informed Oswaldo received the refill request today. Oswaldo stated they had been sending refill request. Informed Oswaldo that have not not received any refill request until today but when Oswaldo looked it up he stated they had been sending the prescription refill request to Dr. Joe Brown. Informed Oswaldo will let Dr. Saldaña know. Oswaldo verbalized understanding.

## 2024-12-12 NOTE — TELEPHONE ENCOUNTER
Spoke with Oswaldo, and Dr. Saldaña does not prescribe Lamotrigine, patient's psychiatrist does. Dr. Saldaña only prescribes the Levetiracetam. Refill for Lamotrigine was sent in error so cancel prescription. Oswaldo verbalized understanding and will try to contact patient's psychiatrist.

## 2024-12-12 NOTE — TELEPHONE ENCOUNTER
Requesting refill for RX   lamoTRIgine (LAMICTAL) 100 MG tablet  and RX   lamoTRIgine (LAMICTAL) 25 MG tablet pt is out of medication.   [facility] -   [place], [place] - [address] -   P [phone] - F [phone]

## 2024-12-12 NOTE — TELEPHONE ENCOUNTER
Edie requesting a call to discuss the prescription she received for Lamitcal 25 mg and 100 mg.    She stated patient been out of medication since 11/1/24 and is recommenced to start at 25 mg and go back up

## 2024-12-23 ENCOUNTER — TELEPHONE (OUTPATIENT)
Age: 49
End: 2024-12-23

## 2024-12-23 NOTE — TELEPHONE ENCOUNTER
Requesting a call back concerning pt's RX lamoTRIgine (LAMICTAL) 100 MG tablet  and RX   lamoTRIgine (LAMICTAL) 25 MG

## 2024-12-24 NOTE — TELEPHONE ENCOUNTER
Outgoing call to [facility]- spoke to pharmacist.     They state that pt has been out/off the Lamictal since 7/31/2024 - was previously on 125 mg daily. Previously rx'ed by pt's psychiatrist, HOLLY Bal, who has not been responding to the refill requests they have been sending him.     We sent in an RX on 12/12/24 as an interim refill. Pharmacy did not fill it as pt has been off so long would need a taper dose to start back up. Rx was canceled as it was sent to us in error in the first place.    Per Dr ARCE note he rx's Keppra for the pt's seizure disorder and this Lamictal issue needs to be addressed by HOLLY Bal/psych.     Return call to HOLLY Bal - left  to call us back.

## 2025-02-18 ENCOUNTER — TELEPHONE (OUTPATIENT)
Age: 50
End: 2025-02-18

## 2025-04-10 RX ORDER — ASPIRIN 325 MG
TABLET, DELAYED RELEASE (ENTERIC COATED) ORAL
Qty: 30 TABLET | Refills: 0 | Status: SHIPPED | OUTPATIENT
Start: 2025-04-10

## 2025-05-12 RX ORDER — SENNOSIDES 8.6 MG
CAPSULE ORAL
Qty: 30 TABLET | Status: SHIPPED | OUTPATIENT
Start: 2025-05-12

## 2025-08-25 DIAGNOSIS — G40.909 SEIZURE DISORDER (HCC): Primary | ICD-10-CM

## 2025-08-26 RX ORDER — DIVALPROEX SODIUM 500 MG/1
500 TABLET, DELAYED RELEASE ORAL 2 TIMES DAILY
Qty: 60 TABLET | Refills: 2 | Status: SHIPPED | OUTPATIENT
Start: 2025-08-26